# Patient Record
Sex: FEMALE | Race: WHITE | Employment: UNEMPLOYED | URBAN - METROPOLITAN AREA
[De-identification: names, ages, dates, MRNs, and addresses within clinical notes are randomized per-mention and may not be internally consistent; named-entity substitution may affect disease eponyms.]

---

## 2020-03-09 ENCOUNTER — APPOINTMENT (OUTPATIENT)
Dept: GENERAL RADIOLOGY | Age: 65
End: 2020-03-09
Payer: MEDICARE

## 2020-03-09 ENCOUNTER — HOSPITAL ENCOUNTER (EMERGENCY)
Age: 65
Discharge: HOME | End: 2020-03-09
Payer: MEDICARE

## 2020-03-09 ENCOUNTER — APPOINTMENT (OUTPATIENT)
Dept: CT IMAGING | Age: 65
End: 2020-03-09
Payer: MEDICARE

## 2020-03-09 VITALS
WEIGHT: 167.55 LBS | HEART RATE: 80 BPM | OXYGEN SATURATION: 93 % | RESPIRATION RATE: 14 BRPM | TEMPERATURE: 98.2 F | DIASTOLIC BLOOD PRESSURE: 58 MMHG | SYSTOLIC BLOOD PRESSURE: 135 MMHG

## 2020-03-09 PROCEDURE — 99285 EMERGENCY DEPT VISIT HI MDM: CPT | Performed by: ORTHOPAEDIC SURGERY

## 2020-03-09 PROCEDURE — 99284 EMERGENCY DEPT VISIT MOD MDM: CPT

## 2020-03-09 PROCEDURE — 73110 X-RAY EXAM OF WRIST: CPT

## 2020-03-09 PROCEDURE — 73080 X-RAY EXAM OF ELBOW: CPT

## 2020-03-09 PROCEDURE — 73030 X-RAY EXAM OF SHOULDER: CPT

## 2020-03-09 PROCEDURE — 73200 CT UPPER EXTREMITY W/O DYE: CPT

## 2020-03-09 NOTE — ED NOTES
Patient placed on bedpan, voided approx 100 cc dark yellow urine.  Patient aware will be going back to [facility]     Robertosahara Cooley RN  03/09/20 8922

## 2020-03-09 NOTE — ED NOTES
Bed: B-09  Expected date:   Expected time:   Means of arrival:   Comments:  75 F  Fall  Elbow injury     Trini Browne RN  03/09/20 2660

## 2020-03-09 NOTE — CONSULTS
[id number] [facility] Orthopedic Surgery  Consult Note        This patient is seen in consultation at the request of Milka Higginbotham    Reason for Consult:  Right elbow pain/ old radial head fracture with dislocation, no acute fracture. CHIEF COMPLAINT:  Right elbow pain/ fall. History Obtained From:  patient, electronic medical record    HISTORY OF PRESENT ILLNESS:   Ms. Sidra Hsieh is a 75 y.o.  female right handed who seen today for consultation and evaluation of a right elbow injury. The patient reports that this injury occurred when she fell. She was first seen and evaluated in [facility] ED, when she was x-rayed and I was consulted. Pain lateral right elbow is achy, rated 1/10 and radiate to forearm. Movement makes the pain worse, and resting makes the pain better. No numbness or tingling sensation. The patient denies any other injuries. She had a h/o old right elbow injury. Past Medical History:        Diagnosis Date    Anhidrosis     [facility]     Dementia ([facility]     Hypertension     Muscle weakness (generalized)     Schizophrenia ([facility]     Vitamin D deficiency        Past Surgical History:    History reviewed. No pertinent surgical history. Medications prior to admission:   Prior to Admission medications    Not on File       Current Medications:   No current facility-administered medications for this encounter. Allergies:  Patient has no known allergies.     Social History     Socioeconomic History    Marital status: Unknown     Spouse name: Not on file    Number of children: Not on file    Years of education: Not on file    Highest education level: Not on file   Occupational History    Not on file   Social Needs    Financial resource strain: Not on file    Food insecurity     Worry: Not on file     Inability: Not on file    Transportation needs     Medical: Not on file     Non-medical: Not on file   Tobacco Use    Smoking status: Never Smoker    Smokeless tobacco: Never Used   Substance and Sexual Activity    Alcohol use: Not Currently    Drug use: Never    Sexual activity: Not on file   Lifestyle    Physical activity     Days per week: Not on file     Minutes per session: Not on file    Stress: Not on file   Relationships    Social connections     Talks on phone: Not on file     Gets together: Not on file     Attends [employer] service: Not on file     Active member of club or organization: Not on file     Attends meetings of clubs or organizations: Not on file     Relationship status: Not on file    Intimate partner violence     Fear of current or ex partner: Not on file     Emotionally abused: Not on file     Physically abused: Not on file     Forced sexual activity: Not on file   Other Topics Concern    Not on file   Social History Narrative    Not on file       Family History:  History reviewed. No pertinent family history. REVIEW OF SYSTEMS:   CONSTITUTIONAL: Denies unexplained weight loss, fevers, chills or fatigue  NEUROLOGICAL: Denies unsteady gait or progressive weakness    PSYCHOLOGICAL: Denies anxiety, depression   SKIN: Denies skin changes, delayed healing, rash, itching   HEMATOLOGIC: Denies easy bleeding or bruising  ENDOCRINE: Denies excessive thirst, urination, heat/cold  RESPIRATORY: Denies current dyspnea, cough  CARDIOVASCULAR: Negative for chest pain at this time. EYES: Negative for photophobia and visual disturbance. ENT:  Negative for rhinorrhea, epistaxis, sore throat, or hearing loss. GI: Denies nausea, vomiting, diarrhea   : Denies bowel or bladder issues   MUSCULOSKELETAL: Right elbow pain. All other ROS reviewed in chart or with patient or family and are grossly negative. PHYSICAL EXAMINATION:  Ms. Cheng Marks is a very pleasant 75 y.o. female who seen today in no acute distress, awake, alert, and oriented. She is well nourished and groomed. Patient with normal affect. There is no height or weight on file to calculate BMI. . Skin be in 2 weeks PRN. I told the patient that it is not unusual to have some achy pain and swelling for up to a year after a fracture. Thank you very much for the kind consultation and allowing me to participate in this patient's care. I will continue to keep you apprised of her progress.          Vish Puentes MD   3/9/2020  3:17 PM

## 2020-03-09 NOTE — ED PROVIDER NOTES
Diagnosis Date    Anhidrosis     [facility]     [facility]     Hypertension     Muscle weakness (generalized)     [facility]     Vitamin D deficiency        SURGICAL HISTORY     History reviewed. No pertinent surgical history. CURRENT MEDICATIONS       Previous Medications    No medications on file       ALLERGIES     Patient has no known allergies. FAMILY HISTORY     History reviewed. No pertinent family history. No family status information on file. SOCIAL HISTORY      reports that she has never smoked. She has never used smokeless tobacco. She reports previous alcohol use. She reports that she does not use drugs. PHYSICAL EXAM    (up to 7 for level 4, 8 or more for level 5)     ED Triage Vitals [03/09/20 1241]   BP Temp Temp Source Pulse Resp SpO2 Height Weight   -- 97.4 °F (36.3 °C) Oral 78 16 94 % -- 167 lb 8.8 oz (76 kg)       Constitutional:  Appearing well-developed and well-nourished. No distress. HENT:  Normocephalic and atraumatic. Cardiovascular:  Normal rate, regular rhythm, normal heart sounds and intact distal pulses. Pulmonary/Chest:  Effort normal and breath sounds normal. No respiratory distress. Musculoskeletal: Deformity noted to the right elbow, but with decent range of motion to the elbow, and no significant bony tenderness to palpation. Ecchymosis noted to the right forearm and wrist.  Good range of motion to the right wrist and hand, negative for bony tenderness. 2+ radial pulse on the right. Mild tenderness to palpation to the right shoulder, but with good range of motion to the shoulder. No edema exhibited. Sensation to light touch grossly intact and capillary refill <3 seconds in the digits of the right upper extremity. Neurological:  Oriented to person, place, and time. No cranial nerve deficit. Skin:  Skin is warm and dry. Not diaphoretic. Psychiatric:  Normal mood, affect, behavior, judgment and thought content. patient's physical exam was unremarkable other than for the right arm findings noted above. X-rays showed some right elbow abnormalities, and subsequent CT scan of the elbow without contrast confirmed absent radial head with some ulnar dislocation but suggesting a chronic process, no evidence of acute acute fracture. I placed an orthopedics consult, and the patient was visited in the ED by Dr Jeanna Greenwood, who agreed that the abnormalities were nonacute. Patient had good neurovascular status in the affected extremity. She was given a sling in the ED. There was no indication for hospitalization or further workup. Patient will be discharged with instructions to follow-up with Dr. Jeanna Greenwood if needed. The patient verbalized understanding and agreement with this plan of care. The patient was advised to return to the emergency department if symptoms should significantly worsen or if new and concerning symptoms should appear. I estimate there is LOW risk for FRACTURE, COMPARTMENT SYNDROME, DEEP VENOUS THROMBOSIS, SEPTIC ARTHRITIS, TENDON OR NEUROVASCULAR INJURY, thus I consider the discharge disposition reasonable. PROCEDURES:  None    FINAL IMPRESSION      1. Fall at nursing home, initial encounter    2.  Arm injury, right, initial encounter          DISPOSITION/PLAN   DISPOSITION Decision To Discharge 03/09/2020 03:16:34 PM      PATIENT REFERRED TO:  Sherwin Krishna MD  [facility]  [address] [place] [address]  [phone]    Call   if needed, for orthopedic follow-up care      DISCHARGE MEDICATIONS:  New Prescriptions    No medications on file       (Please note that portions of this note were completed with a voice recognition program.  Efforts were made to edit the dictations but occasionally words are mis-transcribed.)    Olga Blair, [id number] [place] [address]  03/09/20 0536

## 2023-12-10 ENCOUNTER — HOSPITAL ENCOUNTER (INPATIENT)
Age: 68
LOS: 2 days | Discharge: NURSING, REHAB OR LONG-TERM CARE | DRG: 291 | End: 2023-12-12
Attending: EMERGENCY MEDICINE | Admitting: HOSPITALIST
Payer: MEDICARE

## 2023-12-10 ENCOUNTER — APPOINTMENT (OUTPATIENT)
Dept: CT IMAGING | Age: 68
DRG: 291 | End: 2023-12-10
Payer: MEDICARE

## 2023-12-10 ENCOUNTER — APPOINTMENT (OUTPATIENT)
Dept: GENERAL RADIOLOGY | Age: 68
DRG: 291 | End: 2023-12-10
Payer: MEDICARE

## 2023-12-10 DIAGNOSIS — R09.02 HYPOXIA: ICD-10-CM

## 2023-12-10 DIAGNOSIS — J90 PLEURAL EFFUSION: Primary | ICD-10-CM

## 2023-12-10 DIAGNOSIS — F41.9 ANXIETY: ICD-10-CM

## 2023-12-10 PROBLEM — I50.9 ACUTE ON CHRONIC HEART FAILURE, UNSPECIFIED HEART FAILURE TYPE (HCC): Status: ACTIVE | Noted: 2023-12-10

## 2023-12-10 PROBLEM — F20.9 SCHIZOPHRENIA (HCC): Status: ACTIVE | Noted: 2023-12-10

## 2023-12-10 LAB
ALBUMIN SERPL-MCNC: 1.9 G/DL (ref 3.4–5)
ALP SERPL-CCNC: 70 U/L (ref 40–129)
ALT SERPL-CCNC: <5 U/L (ref 10–40)
ANION GAP SERPL CALCULATED.3IONS-SCNC: 9 MMOL/L (ref 3–16)
ANION GAP SERPL CALCULATED.3IONS-SCNC: 9 MMOL/L (ref 3–16)
AST SERPL-CCNC: 15 U/L (ref 15–37)
BACTERIA URNS QL MICRO: NORMAL /HPF
BASE EXCESS BLDV CALC-SCNC: 9.1 MMOL/L
BASOPHILS # BLD: 0.1 K/UL (ref 0–0.2)
BASOPHILS NFR BLD: 1.3 %
BILIRUB DIRECT SERPL-MCNC: <0.2 MG/DL (ref 0–0.3)
BILIRUB INDIRECT SERPL-MCNC: ABNORMAL MG/DL (ref 0–1)
BILIRUB SERPL-MCNC: 0.3 MG/DL (ref 0–1)
BILIRUB UR QL STRIP.AUTO: NEGATIVE
BUN SERPL-MCNC: 34 MG/DL (ref 7–20)
BUN SERPL-MCNC: 36 MG/DL (ref 7–20)
CALCIUM SERPL-MCNC: 7.2 MG/DL (ref 8.3–10.6)
CALCIUM SERPL-MCNC: 7.9 MG/DL (ref 8.3–10.6)
CHLORIDE SERPL-SCNC: 100 MMOL/L (ref 99–110)
CHLORIDE SERPL-SCNC: 103 MMOL/L (ref 99–110)
CLARITY UR: CLEAR
CO2 BLDV-SCNC: 34 MMOL/L
CO2 SERPL-SCNC: 26 MMOL/L (ref 21–32)
CO2 SERPL-SCNC: 29 MMOL/L (ref 21–32)
COHGB MFR BLDV: 1.8 %
COLOR UR: YELLOW
CREAT SERPL-MCNC: 1.4 MG/DL (ref 0.6–1.2)
CREAT SERPL-MCNC: 1.5 MG/DL (ref 0.6–1.2)
DEPRECATED RDW RBC AUTO: 16.3 % (ref 12.4–15.4)
EOSINOPHIL # BLD: 0.2 K/UL (ref 0–0.6)
EOSINOPHIL NFR BLD: 1.8 %
EPI CELLS #/AREA URNS AUTO: 0 /HPF (ref 0–5)
GFR SERPLBLD CREATININE-BSD FMLA CKD-EPI: 37 ML/MIN/{1.73_M2}
GFR SERPLBLD CREATININE-BSD FMLA CKD-EPI: 40 ML/MIN/{1.73_M2}
GLUCOSE SERPL-MCNC: 83 MG/DL (ref 70–99)
GLUCOSE SERPL-MCNC: 87 MG/DL (ref 70–99)
GLUCOSE UR STRIP.AUTO-MCNC: NEGATIVE MG/DL
HCO3 BLDV-SCNC: 33 MMOL/L (ref 23–29)
HCT VFR BLD AUTO: 24 % (ref 36–48)
HGB BLD-MCNC: 8.4 G/DL (ref 12–16)
HGB UR QL STRIP.AUTO: NEGATIVE
HYALINE CASTS #/AREA URNS AUTO: 2 /LPF (ref 0–8)
IRON SATN MFR SERPL: 24 % (ref 15–50)
IRON SERPL-MCNC: 48 UG/DL (ref 37–145)
KETONES UR STRIP.AUTO-MCNC: NEGATIVE MG/DL
LACTATE BLDV-SCNC: 0.7 MMOL/L (ref 0.4–2)
LEUKOCYTE ESTERASE UR QL STRIP.AUTO: ABNORMAL
LIPASE SERPL-CCNC: 81 U/L (ref 13–60)
LYMPHOCYTES # BLD: 2.5 K/UL (ref 1–5.1)
LYMPHOCYTES NFR BLD: 26.8 %
MAGNESIUM SERPL-MCNC: 2.1 MG/DL (ref 1.8–2.4)
MCH RBC QN AUTO: 33.7 PG (ref 26–34)
MCHC RBC AUTO-ENTMCNC: 34.8 G/DL (ref 31–36)
MCV RBC AUTO: 96.8 FL (ref 80–100)
METHGB MFR BLDV: 0.6 %
MONOCYTES # BLD: 0.5 K/UL (ref 0–1.3)
MONOCYTES NFR BLD: 5 %
NEUTROPHILS # BLD: 6.1 K/UL (ref 1.7–7.7)
NEUTROPHILS NFR BLD: 65.1 %
NITRITE UR QL STRIP.AUTO: NEGATIVE
NT-PROBNP SERPL-MCNC: 2005 PG/ML (ref 0–124)
O2 THERAPY: ABNORMAL
PCO2 BLDV: 41.9 MMHG (ref 40–50)
PH BLDV: 7.5 [PH] (ref 7.35–7.45)
PH UR STRIP.AUTO: 6.5 [PH] (ref 5–8)
PLATELET # BLD AUTO: 534 K/UL (ref 135–450)
PMV BLD AUTO: 8.4 FL (ref 5–10.5)
PO2 BLDV: 54 MMHG
POTASSIUM SERPL-SCNC: 3.7 MMOL/L (ref 3.5–5.1)
POTASSIUM SERPL-SCNC: 3.7 MMOL/L (ref 3.5–5.1)
PROT SERPL-MCNC: 5.5 G/DL (ref 6.4–8.2)
PROT UR STRIP.AUTO-MCNC: NEGATIVE MG/DL
RBC # BLD AUTO: 2.48 M/UL (ref 4–5.2)
RBC CLUMPS #/AREA URNS AUTO: 2 /HPF (ref 0–4)
SAO2 % BLDV: 89 %
SARS-COV-2 RDRP RESP QL NAA+PROBE: NOT DETECTED
SODIUM SERPL-SCNC: 138 MMOL/L (ref 136–145)
SODIUM SERPL-SCNC: 138 MMOL/L (ref 136–145)
SP GR UR STRIP.AUTO: 1.01 (ref 1–1.03)
TIBC SERPL-MCNC: 203 UG/DL (ref 260–445)
TROPONIN, HIGH SENSITIVITY: 23 NG/L (ref 0–14)
TROPONIN, HIGH SENSITIVITY: 24 NG/L (ref 0–14)
UA COMPLETE W REFLEX CULTURE PNL UR: ABNORMAL
UA DIPSTICK W REFLEX MICRO PNL UR: YES
URN SPEC COLLECT METH UR: ABNORMAL
UROBILINOGEN UR STRIP-ACNC: 1 E.U./DL
WBC # BLD AUTO: 9.4 K/UL (ref 4–11)
WBC #/AREA URNS AUTO: 5 /HPF (ref 0–5)

## 2023-12-10 PROCEDURE — 83880 ASSAY OF NATRIURETIC PEPTIDE: CPT

## 2023-12-10 PROCEDURE — 83540 ASSAY OF IRON: CPT

## 2023-12-10 PROCEDURE — 84484 ASSAY OF TROPONIN QUANT: CPT

## 2023-12-10 PROCEDURE — 6360000002 HC RX W HCPCS: Performed by: HOSPITALIST

## 2023-12-10 PROCEDURE — 2580000003 HC RX 258: Performed by: EMERGENCY MEDICINE

## 2023-12-10 PROCEDURE — 6360000002 HC RX W HCPCS: Performed by: EMERGENCY MEDICINE

## 2023-12-10 PROCEDURE — 2700000000 HC OXYGEN THERAPY PER DAY

## 2023-12-10 PROCEDURE — 36415 COLL VENOUS BLD VENIPUNCTURE: CPT

## 2023-12-10 PROCEDURE — 71045 X-RAY EXAM CHEST 1 VIEW: CPT

## 2023-12-10 PROCEDURE — 87635 SARS-COV-2 COVID-19 AMP PRB: CPT

## 2023-12-10 PROCEDURE — 85025 COMPLETE CBC W/AUTO DIFF WBC: CPT

## 2023-12-10 PROCEDURE — P9612 CATHETERIZE FOR URINE SPEC: HCPCS

## 2023-12-10 PROCEDURE — 2060000000 HC ICU INTERMEDIATE R&B

## 2023-12-10 PROCEDURE — 83550 IRON BINDING TEST: CPT

## 2023-12-10 PROCEDURE — 70450 CT HEAD/BRAIN W/O DYE: CPT

## 2023-12-10 PROCEDURE — 87040 BLOOD CULTURE FOR BACTERIA: CPT

## 2023-12-10 PROCEDURE — 99285 EMERGENCY DEPT VISIT HI MDM: CPT

## 2023-12-10 PROCEDURE — 2580000003 HC RX 258: Performed by: HOSPITALIST

## 2023-12-10 PROCEDURE — 6370000000 HC RX 637 (ALT 250 FOR IP): Performed by: HOSPITALIST

## 2023-12-10 PROCEDURE — 83605 ASSAY OF LACTIC ACID: CPT

## 2023-12-10 PROCEDURE — 96372 THER/PROPH/DIAG INJ SC/IM: CPT

## 2023-12-10 PROCEDURE — 74176 CT ABD & PELVIS W/O CONTRAST: CPT

## 2023-12-10 PROCEDURE — 81001 URINALYSIS AUTO W/SCOPE: CPT

## 2023-12-10 PROCEDURE — 82803 BLOOD GASES ANY COMBINATION: CPT

## 2023-12-10 PROCEDURE — 80076 HEPATIC FUNCTION PANEL: CPT

## 2023-12-10 PROCEDURE — 80048 BASIC METABOLIC PNL TOTAL CA: CPT

## 2023-12-10 PROCEDURE — 6370000000 HC RX 637 (ALT 250 FOR IP): Performed by: EMERGENCY MEDICINE

## 2023-12-10 PROCEDURE — 83735 ASSAY OF MAGNESIUM: CPT

## 2023-12-10 PROCEDURE — 96374 THER/PROPH/DIAG INJ IV PUSH: CPT

## 2023-12-10 PROCEDURE — 83690 ASSAY OF LIPASE: CPT

## 2023-12-10 PROCEDURE — 94640 AIRWAY INHALATION TREATMENT: CPT

## 2023-12-10 PROCEDURE — 94760 N-INVAS EAR/PLS OXIMETRY 1: CPT

## 2023-12-10 RX ORDER — SODIUM CHLORIDE 0.9 % (FLUSH) 0.9 %
5-40 SYRINGE (ML) INJECTION PRN
Status: DISCONTINUED | OUTPATIENT
Start: 2023-12-10 | End: 2023-12-12 | Stop reason: HOSPADM

## 2023-12-10 RX ORDER — HALOPERIDOL 5 MG/ML
5 INJECTION INTRAMUSCULAR ONCE
Status: COMPLETED | OUTPATIENT
Start: 2023-12-10 | End: 2023-12-10

## 2023-12-10 RX ORDER — ACETAMINOPHEN 650 MG/1
650 SUPPOSITORY RECTAL EVERY 6 HOURS PRN
Status: DISCONTINUED | OUTPATIENT
Start: 2023-12-10 | End: 2023-12-12 | Stop reason: HOSPADM

## 2023-12-10 RX ORDER — RISPERIDONE 1 MG/1
2 TABLET ORAL 2 TIMES DAILY
Status: DISCONTINUED | OUTPATIENT
Start: 2023-12-10 | End: 2023-12-12 | Stop reason: HOSPADM

## 2023-12-10 RX ORDER — 0.9 % SODIUM CHLORIDE 0.9 %
1000 INTRAVENOUS SOLUTION INTRAVENOUS ONCE
Status: COMPLETED | OUTPATIENT
Start: 2023-12-10 | End: 2023-12-10

## 2023-12-10 RX ORDER — FUROSEMIDE 10 MG/ML
40 INJECTION INTRAMUSCULAR; INTRAVENOUS 2 TIMES DAILY
Status: DISCONTINUED | OUTPATIENT
Start: 2023-12-10 | End: 2023-12-12 | Stop reason: HOSPADM

## 2023-12-10 RX ORDER — POLYETHYLENE GLYCOL 3350 17 G/17G
17 POWDER, FOR SOLUTION ORAL DAILY PRN
COMMUNITY

## 2023-12-10 RX ORDER — ACETAMINOPHEN 325 MG/1
650 TABLET ORAL EVERY 6 HOURS PRN
COMMUNITY

## 2023-12-10 RX ORDER — FERROUS SULFATE 325(65) MG
325 TABLET ORAL
COMMUNITY

## 2023-12-10 RX ORDER — ASCORBIC ACID 500 MG
500 TABLET ORAL DAILY
COMMUNITY

## 2023-12-10 RX ORDER — POLYETHYLENE GLYCOL 3350 17 G/17G
17 POWDER, FOR SOLUTION ORAL DAILY PRN
Status: DISCONTINUED | OUTPATIENT
Start: 2023-12-10 | End: 2023-12-12 | Stop reason: HOSPADM

## 2023-12-10 RX ORDER — MIDODRINE HYDROCHLORIDE 5 MG/1
10 TABLET ORAL ONCE
Status: COMPLETED | OUTPATIENT
Start: 2023-12-10 | End: 2023-12-10

## 2023-12-10 RX ORDER — GUAIFENESIN 600 MG/1
600 TABLET, EXTENDED RELEASE ORAL DAILY
Status: DISCONTINUED | OUTPATIENT
Start: 2023-12-10 | End: 2023-12-12 | Stop reason: HOSPADM

## 2023-12-10 RX ORDER — LORAZEPAM 0.5 MG/1
0.5 TABLET ORAL EVERY 12 HOURS PRN
Status: ON HOLD | COMMUNITY
End: 2023-12-12 | Stop reason: SDUPTHER

## 2023-12-10 RX ORDER — SENNA AND DOCUSATE SODIUM 50; 8.6 MG/1; MG/1
1 TABLET, FILM COATED ORAL NIGHTLY
COMMUNITY

## 2023-12-10 RX ORDER — POTASSIUM CHLORIDE 7.45 MG/ML
10 INJECTION INTRAVENOUS PRN
Status: DISCONTINUED | OUTPATIENT
Start: 2023-12-10 | End: 2023-12-12 | Stop reason: HOSPADM

## 2023-12-10 RX ORDER — RISPERIDONE 2 MG/1
2 TABLET ORAL 2 TIMES DAILY
COMMUNITY

## 2023-12-10 RX ORDER — ENOXAPARIN SODIUM 100 MG/ML
40 INJECTION SUBCUTANEOUS DAILY
Status: DISCONTINUED | OUTPATIENT
Start: 2023-12-10 | End: 2023-12-10

## 2023-12-10 RX ORDER — ONDANSETRON 2 MG/ML
4 INJECTION INTRAMUSCULAR; INTRAVENOUS EVERY 6 HOURS PRN
Status: DISCONTINUED | OUTPATIENT
Start: 2023-12-10 | End: 2023-12-12 | Stop reason: HOSPADM

## 2023-12-10 RX ORDER — DIVALPROEX SODIUM 125 MG/1
750 CAPSULE, COATED PELLETS ORAL NIGHTLY
COMMUNITY

## 2023-12-10 RX ORDER — CALCIUM CARBONATE 500 MG/1
250 TABLET, CHEWABLE ORAL 2 TIMES DAILY
Status: DISCONTINUED | OUTPATIENT
Start: 2023-12-10 | End: 2023-12-12 | Stop reason: HOSPADM

## 2023-12-10 RX ORDER — DULOXETIN HYDROCHLORIDE 30 MG/1
30 CAPSULE, DELAYED RELEASE ORAL DAILY
COMMUNITY

## 2023-12-10 RX ORDER — SODIUM CHLORIDE 0.9 % (FLUSH) 0.9 %
5-40 SYRINGE (ML) INJECTION EVERY 12 HOURS SCHEDULED
Status: DISCONTINUED | OUTPATIENT
Start: 2023-12-10 | End: 2023-12-12 | Stop reason: HOSPADM

## 2023-12-10 RX ORDER — AMIODARONE HYDROCHLORIDE 200 MG/1
200 TABLET ORAL DAILY
COMMUNITY
Start: 2023-12-12

## 2023-12-10 RX ORDER — LORAZEPAM 2 MG/ML
2 INJECTION INTRAMUSCULAR ONCE
Status: COMPLETED | OUTPATIENT
Start: 2023-12-10 | End: 2023-12-10

## 2023-12-10 RX ORDER — AMIODARONE HYDROCHLORIDE 200 MG/1
400 TABLET ORAL 2 TIMES DAILY
Status: DISPENSED | OUTPATIENT
Start: 2023-12-10 | End: 2023-12-11

## 2023-12-10 RX ORDER — MIRTAZAPINE 7.5 MG/1
7.5 TABLET, FILM COATED ORAL NIGHTLY
COMMUNITY

## 2023-12-10 RX ORDER — TROSPIUM CHLORIDE 20 MG/1
20 TABLET, FILM COATED ORAL
Status: DISCONTINUED | OUTPATIENT
Start: 2023-12-10 | End: 2023-12-12 | Stop reason: HOSPADM

## 2023-12-10 RX ORDER — FERROUS SULFATE 325(65) MG
325 TABLET ORAL
Status: DISCONTINUED | OUTPATIENT
Start: 2023-12-10 | End: 2023-12-12 | Stop reason: HOSPADM

## 2023-12-10 RX ORDER — VIBEGRON 75 MG/1
75 TABLET, FILM COATED ORAL DAILY
COMMUNITY

## 2023-12-10 RX ORDER — IPRATROPIUM BROMIDE AND ALBUTEROL SULFATE 2.5; .5 MG/3ML; MG/3ML
1 SOLUTION RESPIRATORY (INHALATION) ONCE
Status: COMPLETED | OUTPATIENT
Start: 2023-12-10 | End: 2023-12-10

## 2023-12-10 RX ORDER — AMIODARONE HYDROCHLORIDE 200 MG/1
200 TABLET ORAL DAILY
Status: DISCONTINUED | OUTPATIENT
Start: 2023-12-12 | End: 2023-12-12 | Stop reason: HOSPADM

## 2023-12-10 RX ORDER — POTASSIUM CHLORIDE 20 MEQ/1
40 TABLET, EXTENDED RELEASE ORAL ONCE
Status: COMPLETED | OUTPATIENT
Start: 2023-12-10 | End: 2023-12-10

## 2023-12-10 RX ORDER — LORAZEPAM 0.5 MG/1
0.5 TABLET ORAL EVERY 12 HOURS PRN
Status: DISCONTINUED | OUTPATIENT
Start: 2023-12-10 | End: 2023-12-12 | Stop reason: HOSPADM

## 2023-12-10 RX ORDER — DIVALPROEX SODIUM 125 MG/1
500 CAPSULE, COATED PELLETS ORAL EVERY MORNING
COMMUNITY

## 2023-12-10 RX ORDER — ONDANSETRON 4 MG/1
4 TABLET, ORALLY DISINTEGRATING ORAL EVERY 8 HOURS PRN
Status: DISCONTINUED | OUTPATIENT
Start: 2023-12-10 | End: 2023-12-12 | Stop reason: HOSPADM

## 2023-12-10 RX ORDER — MIDODRINE HYDROCHLORIDE 5 MG/1
5 TABLET ORAL 3 TIMES DAILY PRN
Status: DISCONTINUED | OUTPATIENT
Start: 2023-12-10 | End: 2023-12-12 | Stop reason: HOSPADM

## 2023-12-10 RX ORDER — AMIODARONE HYDROCHLORIDE 200 MG/1
400 TABLET ORAL 2 TIMES DAILY
Status: ON HOLD | COMMUNITY
Start: 2023-12-08 | End: 2023-12-12 | Stop reason: HOSPADM

## 2023-12-10 RX ORDER — ASCORBIC ACID 500 MG
500 TABLET ORAL DAILY
Status: DISCONTINUED | OUTPATIENT
Start: 2023-12-10 | End: 2023-12-12 | Stop reason: HOSPADM

## 2023-12-10 RX ORDER — POLYETHYLENE GLYCOL 3350 17 G/17G
17 POWDER, FOR SOLUTION ORAL DAILY
Status: DISCONTINUED | OUTPATIENT
Start: 2023-12-10 | End: 2023-12-10 | Stop reason: SDUPTHER

## 2023-12-10 RX ORDER — ACETAMINOPHEN 325 MG/1
650 TABLET ORAL EVERY 6 HOURS PRN
Status: DISCONTINUED | OUTPATIENT
Start: 2023-12-10 | End: 2023-12-12 | Stop reason: HOSPADM

## 2023-12-10 RX ORDER — DULOXETIN HYDROCHLORIDE 30 MG/1
30 CAPSULE, DELAYED RELEASE ORAL DAILY
Status: DISCONTINUED | OUTPATIENT
Start: 2023-12-10 | End: 2023-12-12 | Stop reason: HOSPADM

## 2023-12-10 RX ORDER — POTASSIUM CHLORIDE 20 MEQ/1
40 TABLET, EXTENDED RELEASE ORAL PRN
Status: DISCONTINUED | OUTPATIENT
Start: 2023-12-10 | End: 2023-12-12 | Stop reason: HOSPADM

## 2023-12-10 RX ORDER — DIVALPROEX SODIUM 125 MG/1
500 CAPSULE, COATED PELLETS ORAL EVERY MORNING
Status: DISCONTINUED | OUTPATIENT
Start: 2023-12-10 | End: 2023-12-12 | Stop reason: HOSPADM

## 2023-12-10 RX ORDER — SENNA AND DOCUSATE SODIUM 50; 8.6 MG/1; MG/1
1 TABLET, FILM COATED ORAL NIGHTLY
Status: DISCONTINUED | OUTPATIENT
Start: 2023-12-10 | End: 2023-12-12 | Stop reason: HOSPADM

## 2023-12-10 RX ORDER — CODEINE PHOSPHATE AND GUAIFENESIN 10; 100 MG/5ML; MG/5ML
5 SOLUTION ORAL 4 TIMES DAILY PRN
Status: ON HOLD | COMMUNITY
End: 2023-12-10

## 2023-12-10 RX ORDER — MIDODRINE HYDROCHLORIDE 5 MG/1
TABLET ORAL SEE ADMIN INSTRUCTIONS
COMMUNITY

## 2023-12-10 RX ORDER — FUROSEMIDE 10 MG/ML
40 INJECTION INTRAMUSCULAR; INTRAVENOUS ONCE
Status: COMPLETED | OUTPATIENT
Start: 2023-12-10 | End: 2023-12-10

## 2023-12-10 RX ORDER — GUAIFENESIN 400 MG/1
400 TABLET ORAL DAILY
COMMUNITY

## 2023-12-10 RX ORDER — MIRTAZAPINE 15 MG/1
7.5 TABLET, FILM COATED ORAL NIGHTLY
Status: DISCONTINUED | OUTPATIENT
Start: 2023-12-10 | End: 2023-12-12 | Stop reason: HOSPADM

## 2023-12-10 RX ORDER — SODIUM CHLORIDE 9 MG/ML
INJECTION, SOLUTION INTRAVENOUS PRN
Status: DISCONTINUED | OUTPATIENT
Start: 2023-12-10 | End: 2023-12-12 | Stop reason: HOSPADM

## 2023-12-10 RX ORDER — MAGNESIUM SULFATE IN WATER 40 MG/ML
2000 INJECTION, SOLUTION INTRAVENOUS PRN
Status: DISCONTINUED | OUTPATIENT
Start: 2023-12-10 | End: 2023-12-12 | Stop reason: HOSPADM

## 2023-12-10 RX ORDER — DIVALPROEX SODIUM 125 MG/1
750 CAPSULE, COATED PELLETS ORAL NIGHTLY
Status: DISCONTINUED | OUTPATIENT
Start: 2023-12-10 | End: 2023-12-12 | Stop reason: HOSPADM

## 2023-12-10 RX ADMIN — RISPERIDONE 2 MG: 1 TABLET ORAL at 10:26

## 2023-12-10 RX ADMIN — HALOPERIDOL LACTATE 5 MG: 5 INJECTION, SOLUTION INTRAMUSCULAR at 03:07

## 2023-12-10 RX ADMIN — LORAZEPAM 0.5 MG: 0.5 TABLET ORAL at 14:29

## 2023-12-10 RX ADMIN — ANTACID TABLETS 250 MG: 500 TABLET, CHEWABLE ORAL at 10:28

## 2023-12-10 RX ADMIN — Medication 10 ML: at 10:29

## 2023-12-10 RX ADMIN — OXYCODONE HYDROCHLORIDE AND ACETAMINOPHEN 500 MG: 500 TABLET ORAL at 10:28

## 2023-12-10 RX ADMIN — AMIODARONE HYDROCHLORIDE 400 MG: 200 TABLET ORAL at 10:26

## 2023-12-10 RX ADMIN — DOCUSATE SODIUM 50MG AND SENNOSIDES 8.6MG 1 TABLET: 8.6; 5 TABLET, FILM COATED ORAL at 22:18

## 2023-12-10 RX ADMIN — ANTACID TABLETS 250 MG: 500 TABLET, CHEWABLE ORAL at 22:19

## 2023-12-10 RX ADMIN — MIDODRINE HYDROCHLORIDE 5 MG: 5 TABLET ORAL at 10:28

## 2023-12-10 RX ADMIN — DIVALPROEX SODIUM 750 MG: 125 CAPSULE, COATED PELLETS ORAL at 22:18

## 2023-12-10 RX ADMIN — RISPERIDONE 2 MG: 1 TABLET ORAL at 22:19

## 2023-12-10 RX ADMIN — FUROSEMIDE 40 MG: 10 INJECTION, SOLUTION INTRAMUSCULAR; INTRAVENOUS at 17:54

## 2023-12-10 RX ADMIN — TROSPIUM CHLORIDE 20 MG: 20 TABLET, FILM COATED ORAL at 17:53

## 2023-12-10 RX ADMIN — FUROSEMIDE 40 MG: 10 INJECTION, SOLUTION INTRAMUSCULAR; INTRAVENOUS at 10:28

## 2023-12-10 RX ADMIN — CARBIDOPA AND LEVODOPA 1 TABLET: 25; 100 TABLET ORAL at 10:26

## 2023-12-10 RX ADMIN — POTASSIUM CHLORIDE 40 MEQ: 1500 TABLET, EXTENDED RELEASE ORAL at 14:39

## 2023-12-10 RX ADMIN — MIRTAZAPINE 7.5 MG: 15 TABLET, FILM COATED ORAL at 22:19

## 2023-12-10 RX ADMIN — Medication 2 MG: at 05:00

## 2023-12-10 RX ADMIN — GUAIFENESIN 600 MG: 600 TABLET ORAL at 10:27

## 2023-12-10 RX ADMIN — DULOXETINE HYDROCHLORIDE 30 MG: 30 CAPSULE, DELAYED RELEASE ORAL at 10:28

## 2023-12-10 RX ADMIN — MIDODRINE HYDROCHLORIDE 10 MG: 5 TABLET ORAL at 02:42

## 2023-12-10 RX ADMIN — MIDODRINE HYDROCHLORIDE 5 MG: 5 TABLET ORAL at 18:08

## 2023-12-10 RX ADMIN — FUROSEMIDE 40 MG: 10 INJECTION, SOLUTION INTRAMUSCULAR; INTRAVENOUS at 04:00

## 2023-12-10 RX ADMIN — FERROUS SULFATE TAB 325 MG (65 MG ELEMENTAL FE) 325 MG: 325 (65 FE) TAB at 10:27

## 2023-12-10 RX ADMIN — Medication 10 ML: at 22:19

## 2023-12-10 RX ADMIN — IPRATROPIUM BROMIDE AND ALBUTEROL SULFATE 1 DOSE: .5; 3 SOLUTION RESPIRATORY (INHALATION) at 02:16

## 2023-12-10 RX ADMIN — SODIUM CHLORIDE 1000 ML: 9 INJECTION, SOLUTION INTRAVENOUS at 02:11

## 2023-12-10 RX ADMIN — AMIODARONE HYDROCHLORIDE 400 MG: 200 TABLET ORAL at 22:18

## 2023-12-10 RX ADMIN — APIXABAN 5 MG: 5 TABLET, FILM COATED ORAL at 22:19

## 2023-12-10 RX ADMIN — APIXABAN 5 MG: 5 TABLET, FILM COATED ORAL at 10:27

## 2023-12-10 RX ADMIN — CARBIDOPA AND LEVODOPA 1 TABLET: 25; 100 TABLET ORAL at 14:29

## 2023-12-10 RX ADMIN — CARBIDOPA AND LEVODOPA 1 TABLET: 25; 100 TABLET ORAL at 22:19

## 2023-12-10 RX ADMIN — Medication 10 ML: at 17:55

## 2023-12-10 RX ADMIN — DIVALPROEX SODIUM 500 MG: 125 CAPSULE, COATED PELLETS ORAL at 10:27

## 2023-12-10 ASSESSMENT — PAIN - FUNCTIONAL ASSESSMENT: PAIN_FUNCTIONAL_ASSESSMENT: 0-10

## 2023-12-10 ASSESSMENT — PAIN SCALES - GENERAL: PAINLEVEL_OUTOF10: 0

## 2023-12-10 NOTE — ED PROVIDER NOTES
history.    SOCIAL HISTORY       Social History     Socioeconomic History    Marital status: Unknown     Spouse name: None    Number of children: None    Years of education: None    Highest education level: None   Tobacco Use    Smoking status: Never    Smokeless tobacco: Never   Substance and Sexual Activity    Alcohol use: Not Currently    Drug use: Never       PHYSICAL EXAM       ED Triage Vitals [12/10/23 0048]   BP Temp Temp src Pulse Respirations SpO2 Height Weight   (!) 98/54 98.7 °F (37.1 °C) -- 89 16 96 % -- --       Physical Exam  Vitals and nursing note reviewed.   Constitutional:       General: She is not in acute distress.     Appearance: She is well-developed. She is ill-appearing. She is not toxic-appearing or diaphoretic.   HENT:      Head: Normocephalic and atraumatic.      Right Ear: External ear normal.      Left Ear: External ear normal.   Eyes:      General:         Right eye: No discharge.         Left eye: No discharge.      Conjunctiva/sclera: Conjunctivae normal.      Pupils: Pupils are equal, round, and reactive to light.   Cardiovascular:      Rate and Rhythm: Normal rate and regular rhythm.      Heart sounds: No murmur heard.  Pulmonary:      Effort: Pulmonary effort is normal. No respiratory distress.      Breath sounds: Normal breath sounds. No wheezing or rales.   Abdominal:      General: Bowel sounds are normal. There is no distension.      Palpations: Abdomen is soft. There is no mass.      Tenderness: There is no abdominal tenderness. There is no guarding or rebound.   Genitourinary:     Comments: Deferred  Musculoskeletal:         General: No deformity. Normal range of motion.      Cervical back: Normal range of motion and neck supple.   Skin:     General: Skin is warm.      Findings: No erythema or rash.   Neurological:      Mental Status: She is alert. She is disoriented.      Motor: No atrophy or abnormal muscle tone.   Psychiatric:         Behavior: Behavior normal.          Thought Content: Thought content normal.         DIAGNOSTIC RESULTS     EKG: All EKG's are interpreted by the Emergency Department Physician who either signs or Co-signs this chart in the absence of acardiologist.    Interpreted by myself     RADIOLOGY:   Non-plain film images such as CT, Ultrasoundand MRI are read by the radiologist. Plain radiographic images are visualized and preliminarily interpreted by the emergency physician with the below findings:    IMPRESSION:  1. Moderate bilateral pleural effusions are present. Opacified portions of  the posteroinferior lower lobes may be simple compressive atelectasis given  the adjacent effusions. However pneumonia cannot be excluded. 2.  Moderate to large hiatal hernia and likely esophagitis. There is no  bowel obstruction or rectal fecal impaction. 3.  Mild edema of the intra-abdominal fat and presacral fat. However there  is also general body wall edema. Cardiac chambers are not enlarged and  correlate with renal functions/volume overload. 4.  Large simple cyst at the left kidney would not require further workup. No renal calculi or hydronephrosis on either side. 5.  Mild compression deformity L1 but without acute fracture lines or  retropulsion. This may be a chronic deformity but no prior studies for  comparison.        ED BEDSIDE ULTRASOUND:   Performed by ED Physician - none    LABS:  Labs Reviewed   CBC WITH AUTO DIFFERENTIAL - Abnormal; Notable for the following components:       Result Value    RBC 2.48 (*)     Hemoglobin 8.4 (*)     Hematocrit 24.0 (*)     RDW 16.3 (*)     Platelets 015 (*)     All other components within normal limits   BASIC METABOLIC PANEL W/ REFLEX TO MG FOR LOW K - Abnormal; Notable for the following components:    BUN 34 (*)     Creatinine 1.4 (*)     Est, Glom Filt Rate 40 (*)     Calcium 7.9 (*)     All other components within normal limits   BLOOD GAS, VENOUS - Abnormal; Notable for the following components:

## 2023-12-10 NOTE — PROGRESS NOTES
Pharmacy Medication Reconciliation Note     List of medications patient is currently taking is complete. Source of information:   1. Medication list from [address] [place] [address]    Notes regarding home medications:   1. Medication list updated-all medications added.     Kathleen Raygoza PharmD, BCPS  12/10/2023 6:54 AM

## 2023-12-10 NOTE — H&P
V2.0  History and Physical      Name:  Carolyn Melgar /Age/Sex:   (77 y.o. female)   MRN & CSN:  [id number] & [id number] Encounter Date/Time: 12/10/2023 4:24 AM EST   Location:  B-10 PCP: No primary care provider on file. Hospital Day: 1    Assessment and Plan:   Carolyn Melgar is a 77 y.o. female with a pmh of cognitive dysfunction who presents with Acute on chronic heart failure, unspecified heart failure type [facility])    Hospital Problems             Last Modified POA    * (Principal) Acute on chronic heart failure, unspecified heart failure type ([address]) 12/10/2023 Yes    Schizophrenia ([address]) 12/10/2023 Yes       Plan:  Patient received Lasix IV in the emergency department. Will continue Lasix. While on Lasix will be given patient potassium. Trend BMP. Check echocardiogram.  Strict intake and output. Daily weights.  2 g cardiac diet ordered. Acute schizophrenia: Given one-time dose of IV Ativan to relax her. Disposition:   Current Living situation: Nursing home    Expected Disposition: Nursing home  Estimated D/C: 3 days    Diet ADULT DIET; Regular   DVT Prophylaxis [x] Lovenox, []  Heparin, [] SCDs, [] Ambulation,  [] Eliquis, [] Xarelto, [] Coumadin   Code Status Full code   Surrogate Decision Maker/ POA Unknown at this time     Personally reviewed Lab Studies and Imaging     Discussed management of the case with ED provider who recommended admission        Imaging that was interpreted personally includes abdomen and pelvis CT that showed moderate bilateral pleural effusions. Moderate to large hiatal hernia likely esophagitis. Mild edema of intra-abdominal fat and presacral facet. Large simple cyst left kidney. No renal compression L4-S1.       Drugs that require monitoring for toxicity include laxis and the method of monitoring was checking BMP        History from:     patient    History of Present Illness:     Chief Complaint: Abnormal lab results and hypoxia  Carolyn Melgar is   Medications:    Infusions:   PRN Meds:     Labs      CBC:   Recent Labs     12/10/23  0106   WBC 9.4   HGB 8.4*   *     BMP:    Recent Labs     12/10/23  0106      K 3.7      CO2 29   BUN 34*   CREATININE 1.4*   GLUCOSE 87     Hepatic:   Recent Labs     12/10/23  0106   AST 15   ALT <5*   BILITOT 0.3   ALKPHOS 70     Lipids: No results found for: \"CHOL\", \"HDL\", \"TRIG\"  Hemoglobin A1C: No results found for: \"LABA1C\"  TSH: No results found for: \"TSH\"  Troponin: No results found for: \"TROPONINT\"  Lactic Acid:   Recent Labs     12/10/23  0106   LACTA 0.7     BNP:   Recent Labs     12/10/23  0106   PROBNP 2,005*     UA:  Lab Results   Component Value Date/Time    NITRU Negative 12/10/2023 01:06 AM    COLORU Yellow 12/10/2023 01:06 AM    PHUR 6.5 12/10/2023 01:06 AM    WBCUA 5 12/10/2023 01:06 AM    RBCUA 2 12/10/2023 01:06 AM    BACTERIA None Seen 12/10/2023 01:06 AM    CLARITYU Clear 12/10/2023 01:06 AM    SPECGRAV 1.014 12/10/2023 01:06 AM    LEUKOCYTESUR TRACE 12/10/2023 01:06 AM    UROBILINOGEN 1.0 12/10/2023 01:06 AM    BILIRUBINUR Negative 12/10/2023 01:06 AM    BLOODU Negative 12/10/2023 01:06 AM    GLUCOSEU Negative 12/10/2023 01:06 AM    KETUA Negative 12/10/2023 01:06 AM     Urine Cultures: No results found for: \"LABURIN\"  Blood Cultures: No results found for: \"BC\"  No results found for: \"BLOODCULT2\"  Organism: No results found for: \"ORG\"    Imaging/Diagnostics Last 24 Hours   CT CHEST ABDOMEN PELVIS WO CONTRAST Additional Contrast? None    Result Date: 12/10/2023  EXAMINATION: CT OF THE CHEST, ABDOMEN, AND PELVIS WITHOUT CONTRAST 12/10/2023 2:01 am TECHNIQUE: CT of the chest, abdomen and pelvis was performed without the administration of intravenous contrast. Multiplanar reformatted images are provided for review. Automated exposure control, iterative reconstruction, and/or weight based adjustment of the mA/kV was utilized to reduce the radiation dose to as low as reasonably achievable.

## 2023-12-10 NOTE — ED NOTES
Report given to Felisha Joseph RN. She verbalized understanding of patient condition and has no further questions.      Ramona Jarquin  12/10/23 7714

## 2023-12-11 LAB
ANION GAP SERPL CALCULATED.3IONS-SCNC: 2 MMOL/L (ref 3–16)
BUN SERPL-MCNC: 37 MG/DL (ref 7–20)
CALCIUM SERPL-MCNC: 8.1 MG/DL (ref 8.3–10.6)
CHLORIDE SERPL-SCNC: 105 MMOL/L (ref 99–110)
CHOLEST SERPL-MCNC: 127 MG/DL (ref 0–199)
CO2 SERPL-SCNC: 31 MMOL/L (ref 21–32)
CREAT SERPL-MCNC: 1.7 MG/DL (ref 0.6–1.2)
GFR SERPLBLD CREATININE-BSD FMLA CKD-EPI: 32 ML/MIN/{1.73_M2}
GLUCOSE SERPL-MCNC: 92 MG/DL (ref 70–99)
HDLC SERPL-MCNC: 32 MG/DL (ref 40–60)
LDLC SERPL CALC-MCNC: 81 MG/DL
MAGNESIUM SERPL-MCNC: 1.9 MG/DL (ref 1.8–2.4)
POTASSIUM SERPL-SCNC: 4.3 MMOL/L (ref 3.5–5.1)
SODIUM SERPL-SCNC: 138 MMOL/L (ref 136–145)
TRIGL SERPL-MCNC: 70 MG/DL (ref 0–150)
VLDLC SERPL CALC-MCNC: 14 MG/DL

## 2023-12-11 PROCEDURE — 6370000000 HC RX 637 (ALT 250 FOR IP): Performed by: HOSPITALIST

## 2023-12-11 PROCEDURE — 97530 THERAPEUTIC ACTIVITIES: CPT

## 2023-12-11 PROCEDURE — 97162 PT EVAL MOD COMPLEX 30 MIN: CPT

## 2023-12-11 PROCEDURE — 2580000003 HC RX 258: Performed by: HOSPITALIST

## 2023-12-11 PROCEDURE — 97166 OT EVAL MOD COMPLEX 45 MIN: CPT

## 2023-12-11 PROCEDURE — 2060000000 HC ICU INTERMEDIATE R&B

## 2023-12-11 PROCEDURE — 97535 SELF CARE MNGMENT TRAINING: CPT

## 2023-12-11 PROCEDURE — 83735 ASSAY OF MAGNESIUM: CPT

## 2023-12-11 PROCEDURE — 80061 LIPID PANEL: CPT

## 2023-12-11 PROCEDURE — 36415 COLL VENOUS BLD VENIPUNCTURE: CPT

## 2023-12-11 PROCEDURE — 93306 TTE W/DOPPLER COMPLETE: CPT

## 2023-12-11 PROCEDURE — 94760 N-INVAS EAR/PLS OXIMETRY 1: CPT

## 2023-12-11 PROCEDURE — 80048 BASIC METABOLIC PNL TOTAL CA: CPT

## 2023-12-11 PROCEDURE — 6360000002 HC RX W HCPCS: Performed by: HOSPITALIST

## 2023-12-11 RX ORDER — MIDODRINE HYDROCHLORIDE 10 MG/1
10 TABLET ORAL
Status: DISCONTINUED | OUTPATIENT
Start: 2023-12-11 | End: 2023-12-12 | Stop reason: HOSPADM

## 2023-12-11 RX ADMIN — DOCUSATE SODIUM 50MG AND SENNOSIDES 8.6MG 1 TABLET: 8.6; 5 TABLET, FILM COATED ORAL at 20:53

## 2023-12-11 RX ADMIN — AMIODARONE HYDROCHLORIDE 400 MG: 200 TABLET ORAL at 20:52

## 2023-12-11 RX ADMIN — MIRTAZAPINE 7.5 MG: 15 TABLET, FILM COATED ORAL at 20:52

## 2023-12-11 RX ADMIN — APIXABAN 5 MG: 5 TABLET, FILM COATED ORAL at 08:58

## 2023-12-11 RX ADMIN — FERROUS SULFATE TAB 325 MG (65 MG ELEMENTAL FE) 325 MG: 325 (65 FE) TAB at 08:58

## 2023-12-11 RX ADMIN — DULOXETINE HYDROCHLORIDE 30 MG: 30 CAPSULE, DELAYED RELEASE ORAL at 08:58

## 2023-12-11 RX ADMIN — MIDODRINE HYDROCHLORIDE 10 MG: 10 TABLET ORAL at 13:33

## 2023-12-11 RX ADMIN — GUAIFENESIN 600 MG: 600 TABLET ORAL at 08:59

## 2023-12-11 RX ADMIN — CARBIDOPA AND LEVODOPA 1 TABLET: 25; 100 TABLET ORAL at 13:33

## 2023-12-11 RX ADMIN — DIVALPROEX SODIUM 500 MG: 125 CAPSULE, COATED PELLETS ORAL at 08:58

## 2023-12-11 RX ADMIN — RISPERIDONE 2 MG: 1 TABLET ORAL at 08:58

## 2023-12-11 RX ADMIN — ANTACID TABLETS 250 MG: 500 TABLET, CHEWABLE ORAL at 08:58

## 2023-12-11 RX ADMIN — MIDODRINE HYDROCHLORIDE 5 MG: 5 TABLET ORAL at 09:02

## 2023-12-11 RX ADMIN — CARBIDOPA AND LEVODOPA 1 TABLET: 25; 100 TABLET ORAL at 20:53

## 2023-12-11 RX ADMIN — DIVALPROEX SODIUM 750 MG: 125 CAPSULE, COATED PELLETS ORAL at 20:52

## 2023-12-11 RX ADMIN — OXYCODONE HYDROCHLORIDE AND ACETAMINOPHEN 500 MG: 500 TABLET ORAL at 08:59

## 2023-12-11 RX ADMIN — APIXABAN 5 MG: 5 TABLET, FILM COATED ORAL at 20:51

## 2023-12-11 RX ADMIN — TROSPIUM CHLORIDE 20 MG: 20 TABLET, FILM COATED ORAL at 06:52

## 2023-12-11 RX ADMIN — Medication 10 ML: at 21:02

## 2023-12-11 RX ADMIN — ANTACID TABLETS 250 MG: 500 TABLET, CHEWABLE ORAL at 20:53

## 2023-12-11 RX ADMIN — CARBIDOPA AND LEVODOPA 1 TABLET: 25; 100 TABLET ORAL at 08:59

## 2023-12-11 RX ADMIN — TROSPIUM CHLORIDE 20 MG: 20 TABLET, FILM COATED ORAL at 17:01

## 2023-12-11 RX ADMIN — FUROSEMIDE 40 MG: 10 INJECTION, SOLUTION INTRAMUSCULAR; INTRAVENOUS at 18:43

## 2023-12-11 RX ADMIN — MIDODRINE HYDROCHLORIDE 10 MG: 10 TABLET ORAL at 17:01

## 2023-12-11 RX ADMIN — RISPERIDONE 2 MG: 1 TABLET ORAL at 20:52

## 2023-12-11 RX ADMIN — Medication 10 ML: at 08:58

## 2023-12-11 ASSESSMENT — PAIN SCALES - GENERAL: PAINLEVEL_OUTOF10: 0

## 2023-12-11 NOTE — CONSULTS
Comprehensive Nutrition Assessment    Type and Reason for Visit:  Initial, Consult    Nutrition Recommendations/Plan:   Continue No Added Salt / 1500 ml per day  Add Ensure Compact bid to start given DTI to heels & potential for further breakdown as well as need for fluid control     Malnutrition Assessment:  Malnutrition Status:  No malnutrition (12/11/23 1435)    Context:  Chronic Illness         Nutrition Assessment:    Consult per CHF protocol. PMH includes Dementia, CHF. Diet adv to No Added Salt / 1500 ml per day. Pt reported and records confirm good po intake at %. Noted pt with very soft, mushy heels. DTIs noted. Wound care on board. Will offer Ensure Compact bid to start given potential for further skin breakdown and need for fluid modification. Pt is not appropriate for education r/t altered cognition as well as nutrition needs being met via facility. Will continue to follow progress. Nutrition Related Findings:    Labs reviewed. Noted +2 pitting edema to BLE. Noted pt +1.2 liters. Wound Type: Deep Tissue Injury (plus soft mushy heels)       Current Nutrition Intake & Therapies:    Average Meal Intake: %     ADULT DIET; Regular; No Added Salt (3-4 gm); 1500 ml    Est needs: 2542-4032 (20-25 x ABW 74 kg)                      (1.2-1.5 x ABW 74 kg)    Anthropometric Measures:  Height: 165.1 cm (5' 5\")  Ideal Body Weight (IBW): 125 lbs (57 kg)    Admission Body Weight: 74.8 kg (165 lb)  Current Body Weight: 73.9 kg (163 lb),   IBW. Weight Source: Bed Scale  Current BMI (kg/m2): 27.1                          BMI Categories: Overweight (BMI 25.0-29. 9)        Nutrition Diagnosis:   Increased nutrient needs   R/t increased need for nutrition  AEB Wounds    Nutrition Interventions:   Food and/or Nutrient Delivery: Continue Current Diet, Start Oral Nutrition Supplement  Nutrition Education/Counseling: Education not appropriate  Coordination of Nutrition Care: Continue to monitor while

## 2023-12-11 NOTE — FLOWSHEET NOTE
12/11/23 1030   Treatment Team Notification   Reason for Communication Abnormal vitals  (Low BP- holding amiodarone and lasix)   Name of Team Member Notified Dr. Krishna Loaiza Attending Provider   Method of Communication Secure Message   Response No new orders   Notification Time 1026     Patient bp at approximately 0830 was 86/64. PRN midodrine given for md order. Reassessed bp and obtained a reading of 91/50. Held amiodarone and lasix d/t hypotension. MD notified. NNO at this time. Update: See new order for routine midodrine.     Electronically signed by Jerald Kong RN on 12/11/2023 at 11:45 AM

## 2023-12-11 NOTE — PROGRESS NOTES
Occupational Therapy  Facility/Department: [facility] PROGRESSIVE CARE  Occupational Therapy Initial Assessment/Discharge    Name: Dolores Goodwin  :   MRN: [id number]  Date of Service: 2023    Discharge Recommendations:  Defer OT at this time, Other (comment) (pt refusing therapy)  OT Equipment Recommendations  Equipment Needed: No  Dolores Cousins scored a 3/99 on the AM-PAC ADL Inpatient form. At this time, no further OT is recommended upon discharge due to p refusing therapy. Recommend patient returns to prior setting with prior services. Patient Diagnosis(es): The primary encounter diagnosis was Pleural effusion. A diagnosis of Hypoxia was also pertinent to this visit. Past Medical History:  has a past medical history of Anhidrosis, Delusional disorder ([address]), Dementia ([address]), Hypertension, Muscle weakness (generalized), Schizophrenia ([address]), and Vitamin D deficiency. Past Surgical History:  has no past surgical history on file. Treatment Diagnosis: decreased ADL status and fxl transfers in preparation for ADLs      Assessment   Performance deficits / Impairments: Decreased cognition;Decreased ADL status; Decreased high-level IADLs;Decreased posture  Assessment: PTA, pt from [facility]. Difficult to get information from pt, she did not specify her ability to complete ADLs and reported she was able to complete SPT w/ staff to ge into w/c. Today- pt completed bed mobility w/ up to Max A x2, DEP for LB dressing, Max A for UB dressing, and DEP for toileting. Pt is anticipated to require up to DEP for full ADLs based on her current strength, endurance, and coordination as observed today. Pt will not continue to be seen while hospitalized d/t refusing therapy. She is anticipated to return back to [facility] post d/c from here.   Treatment Diagnosis: decreased ADL status and fxl transfers in preparation for ADLs  No Skilled OT: Patient refusal  REQUIRES OT FOLLOW-UP:

## 2023-12-11 NOTE — DISCHARGE INSTR - COC
whole    Wound Care Documentation and Therapy:  Wound 12/10/23 Heel Left;Posterior very soft mushy red nonblanchable (Active)   Wound Image   12/11/23 1000   Wound Etiology Deep tissue/Injury 12/11/23 1000   Dressing Status New dressing applied 12/10/23 2000   Wound Cleansed Not Cleansed 12/10/23 2000   Dressing/Treatment Barrier film 12/11/23 1000   Dressing Change Due 12/11/23 12/11/23 1000   Wound Length (cm) 2 cm 12/11/23 1000   Wound Width (cm) 1.5 cm 12/11/23 1000   Wound Surface Area (cm^2) 3 cm^2 12/11/23 1000   Wound Assessment Purple/maroon 12/11/23 1000   Drainage Amount None (dry) 12/11/23 1000   Odor None 12/11/23 1000   Ximena-wound Assessment Non-blanchable erythema 12/11/23 1000   Margins Defined edges 12/11/23 1000   Number of days: 1       Wound 12/10/23 Heel Right very red sft mushy ,non blanchable (Active)   Wound Image   12/11/23 1000   Wound Etiology Deep tissue/Injury 12/11/23 1000   Wound Cleansed Not Cleansed 12/10/23 0759   Dressing/Treatment Barrier film 12/11/23 1000   Dressing Change Due 12/11/23 12/11/23 1000   Wound Length (cm) 1 cm 12/11/23 1000   Wound Width (cm) 1 cm 12/11/23 1000   Wound Surface Area (cm^2) 1 cm^2 12/11/23 1000   Wound Assessment Purple/maroon 12/11/23 1000   Drainage Amount None (dry) 12/11/23 1000   Odor None 12/11/23 1000   Ximena-wound Assessment Non-blanchable erythema 12/11/23 1000   Margins Defined edges 12/11/23 1000   Number of days: 1        Elimination:  Continence:   Bowel: Yes  Bladder: Yes  Urinary Catheter: None   Colostomy/Ileostomy/Ileal Conduit: No       Date of Last BM: ***    Intake/Output Summary (Last 24 hours) at 12/11/2023 1439  Last data filed at 12/11/2023 1300  Gross per 24 hour   Intake 1750 ml   Output 1200 ml   Net 550 ml     I/O last 3 completed shifts:  In: 2440 [P.O.:1440; IV Piggyback:1000]  Out: 1200 [Urine:1200]    Safety Concerns:     At Risk for Falls    Impairments/Disabilities:      None    Nutrition Therapy:  Current Nutrition  follow up and palliative care consult for ongoing goals of care, end of life, and/or chronic disease management discussions. Patient's personal belongings (please select all that are sent with patient):  None    RN SIGNATURE:  Electronically signed by Raghavendra Estrada RN on 12/12/23 at 1:01 PM EST    CASE MANAGEMENT/SOCIAL WORK SECTION    Inpatient Status Date: 12/10/23    Readmission Risk Assessment Score:  Readmission Risk              Risk of Unplanned Readmission:  24           Discharging to Facility/ Agency   Name:  [facility]  Address:  [address]   Phone:  [phone]  Fax:  [phone]         / signature: Electronically signed by BLAINE Quesada on 12/12/23 at 3:55 PM EST    PHYSICIAN SECTION    Prognosis: Good    Condition at Discharge: Stable    Rehab Potential (if transferring to Rehab): Good    Recommended Labs or Other Treatments After Discharge:     PCP follow up in 2 weeks    Physician Certification: I certify the above information and transfer of Natty Ambrocio  is necessary for the continuing treatment of the diagnosis listed and that she requires [address] for greater 30 days.      Update Admission H&P: No change in H&P    PHYSICIAN SIGNATURE:  Electronically signed by Derrick Pierce MD on 12/12/23 at 10:49 AM EST

## 2023-12-11 NOTE — PROGRESS NOTES
evaluation without incident  Activity Tolerance Comments: pt screaming out, refusing OOB at this time     Plan   Physical Therapy Plan  General Plan: Discharge  Safety Devices  Type of Devices: Bed alarm in place, Call light within reach, Gait belt, Patient at risk for falls, Left in bed, Telesitter in use, Nurse notified  Restraints  Restraints Initially in Place: No     Restrictions  Restrictions/Precautions  Restrictions/Precautions: Up as Tolerated, Fall Risk  Position Activity Restriction  Other position/activity restrictions: wounds bottom and bilateral heels     Subjective   General  Chart Reviewed: Yes  Patient assessed for rehabilitation services?: Yes  Additional Pertinent Hx: \"abnormal labs       Pt present to the ED via ems from home c/o of abnormal lab. Per ems was discharged from  then routine lab were done at the nursing home . Per ems nursing home staff are concerned for sepsis.        HISTORY OF PRESENT ILLNESS   Bernadette Griffin is a 68 y.o. female who presents to the emergency department with abnormal labs.  Patient presents via EMS for abnormal labs.  Recently discharged from  for pneumonia.  Presents today with shortness of breath and found to be hypoxic.  Required a nasal cannula.  Patient altered but at baseline per nursing home.  History otherwise limited as patient has mental status changes that are chronic per nursing home.  No chest pain.  No rash.  No abdominal pain.  No back pain.  \" (Kosta Franklin MD  12/10/23 )  Response To Previous Treatment: Not applicable  Family / Caregiver Present: No  Referring Practitioner: Aurora Praker MD  Referral Date : 12/10/23  Diagnosis: sepsis, abnormal labs  Follows Commands: Impaired  Other (Comment): pt very agitated with cues and therapy attempt poor command following  General Comment  Comments: Co-tx with OT for safety  Subjective  Subjective: Pt needs motivation/redirection for therapy attempt. Pt wit hdecreased insight to  recommendations with focus on things such as getting a pop or BM. Social/Functional History  Social/Functional History  Type of Home: Facility ([facility])  Has the patient had two or more falls in the past year or any fall with injury in the past year?: Unknown  Receives Help From: Other (comment) (facility)  ADL Assistance: Needs assistance  Homemaking Assistance: Needs assistance  Ambulation Assistance: Non-ambulatory (use get up to wheelchair with stand pivot with staff; appears to be more bed bound at this time with postural impairments indicating this)  Transfer Assistance: Needs assistance  Additional Comments: Pt admitted to [facility] From 11/27-12/8 2023 due to pneumonia  Vision/Hearing  Vision  Vision:  (KALIA)  Hearing  Hearing: Within functional limits    Cognition   Orientation  Overall Orientation Status: Within Functional Limits  Cognition  Cognition Comment: KALIA d/t not being able to complete session w/ pt due to behaviors     Objective        Observation/Palpation  Posture: Poor  Observation: unable to achieve neutral ankle positioning, very rigid with minimal active movement  Edema: notable swelling in anthony forearms  Gross Assessment  AROM: Grossly decreased, non-functional  Strength: Grossly decreased, non-functional  Coordination: Grossly decreased, non-functional  Tone: Abnormal                    Bed mobility  Supine to Sit: Dependent/Total;2 Person assistance  Sit to Supine: Moderate assistance  Scooting: Dependent/Total;2 Person assistance  Bed Mobility Comments: Very poor participation with resistance to achieving upright positioning, decreased assistance due to motivated to return to bed. Transfers  Sit to Stand: Unable to assess  Stand to Sit: Unable to assess  Bed to Chair: Unable to assess  Comment: set patient up 1st attempt thanh noriega pt began yelling out, refusing to attempt due to fearful of falling.  THen PT attempted again to stand with patient without DME patient becomes

## 2023-12-11 NOTE — DISCHARGE INSTRUCTIONS
Extra Heart Failure sites:     https://[employer]. com/publication/?r=270588   --- this is American Heart Association interactive Healthier Living with Heart Failure guidebook. Please click hyperlink or copy / paste link into search bar. Use your mouse to scroll through the pages. Lots of information about weight monitoring, diet tips, activity, meds, etc    HF Clarks jose  -- this is a free smart phone jose available for iPhone and Android download. Use your phone to track sodium / fluid intake, zone tool symptom tracking, weights, medications, etc. Click on this hyperlink  HF Clarks Jose   for QR code for easy download. DASH (Dietary Approach to Stop Hypertension) diet --  [email or web address].no -- this diet is a flexible eating plan that promotes heart healthy eating style. Click on hyperlink or copy / paste link into search bar. Lots of low sodium recipes and tips.     [email or web address].ca  -- more free recipes

## 2023-12-11 NOTE — CARE COORDINATION
[facility] Nurse  Consult Note       NAME:  Bernadette Griffin  MEDICAL RECORD NUMBER:  [id number]  AGE: 68 y.o.   GENDER: female  : 1955  TODAY'S DATE:  2023    Subjective   Reason for WOCN Evaluation and Assessment: DTI to AGUSTÍN seth      Bernadette Griffin is a 68 y.o. female referred by:   [] Physician  [x] Nursing  [] Other:     Wound Identification:  Wound Type: pressure  Contributing Factors: chronic pressure and decreased mobility    Wound History: noted on admit  Current Wound Care Treatment:  foam borders    Patient Goal of Care:  [x] Wound Healing  [] Odor Control  [] Palliative Care  [] Pain Control   [] Other:         PAST MEDICAL HISTORY        Diagnosis Date    Anhidrosis     Delusional disorder (HCC)     Dementia (HCC)     Hypertension     Muscle weakness (generalized)     Schizophrenia (HCC)     Vitamin D deficiency        PAST SURGICAL HISTORY    History reviewed. No pertinent surgical history.    FAMILY HISTORY    History reviewed. No pertinent family history.    SOCIAL HISTORY    Social History     Tobacco Use    Smoking status: Never    Smokeless tobacco: Never   Substance Use Topics    Alcohol use: Not Currently    Drug use: Never       ALLERGIES    No Known Allergies    MEDICATIONS    No current facility-administered medications on file prior to encounter.     Current Outpatient Medications on File Prior to Encounter   Medication Sig Dispense Refill    [START ON 2023] amiodarone (CORDARONE) 200 MG tablet Take 1 tablet by mouth daily      amiodarone (CORDARONE) 200 MG tablet Take 2 tablets by mouth 2 times daily      apixaban (ELIQUIS) 5 MG TABS tablet Take 1 tablet by mouth 2 times daily      ascorbic acid (VITAMIN C) 500 MG tablet Take 1 tablet by mouth daily      Calcium Carbonate 500 (200 Ca) MG WAFR Take 200 mg by mouth in the morning and at bedtime      divalproex (DEPAKOTE SPRINKLE) 125 MG DR capsule Take 4 capsules by mouth every morning       to periarea  -turn and reposition every 2 hours  -chair cushion, encourage to reposition self frequently while in chair  -elevate heels in boots, apply liquid barrier film twice daily    Specialty Bed Required : Yes   [x] Low Air Loss   [] Pressure Redistribution  [] Fluid Immersion  [] Bariatric  [] Total Pressure Relief  [] Other:     Current Diet: ADULT DIET; Regular;  No Added Salt (3-4 gm); 1500 ml  Dietician consult:  Yes    Discharge Plan:  Placement for patient upon discharge: skilled nursing    Patient appropriate for Outpatient [address]: No    Referrals:  [x]   [] [facility]  [] Supplies  [] Other    Patient/Caregiver Teaching:  Level of patient/caregiver understanding able to:   [] Indicates understanding       [x] Needs reinforcement  [] Unsuccessful      [] Verbal Understanding  [] Demonstrated understanding       [] No evidence of learning  [] Refused teaching         [] N/A       Electronically signed by ROLANDA Linares on 12/11/2023 at 12:53 PM

## 2023-12-11 NOTE — PLAN OF CARE
Problem: Discharge Planning  Goal: Discharge to home or other facility with appropriate resources  Outcome: Progressing  Discharge to home or other facility with appropriate resources:   Identify barriers to discharge with patient and caregiver   Identify discharge learning needs (meds, wound care, etc)   Refer to discharge planning if patient needs post-hospital services based on physician order or complex needs related to functional status, cognitive ability or social support system   Arrange for needed discharge resources and transportation as appropriate        Problem: Skin/Tissue Integrity  Goal: Absence of new skin breakdown  Description: 1. Monitor for areas of redness and/or skin breakdown  2. Assess vascular access sites hourly  3. Every 4-6 hours minimum:  Change oxygen saturation probe site  4. Every 4-6 hours:  If on nasal continuous positive airway pressure, respiratory therapy assess nares and determine need for appliance change or resting period. Outcome: Progressing  Skin assessment completed every shift. Patient assessed for incontinence, appropriate barrier cream applied prn. Patient encouraged to turn/rotate every 2 hours. Assistance provided if patient unable to do so themselves. Problem: Safety - Adult  Goal: Free from fall injury  Outcome: Progressing  Fall risk assessment completed every shift. All precautions in place. Patient has call light with in reach at all times. Room free from clutter. Patient aware to call for assistance when getting up.

## 2023-12-11 NOTE — CONSULTS
Nutrition Note      Nutrition Note:  Consult received r/t altered skin integrity. Pt already assessed with nutrition plan in place. Please see Nutrition note dated 12/11/23.      Electronically signed by Willam Dallas RD, KORTNEY on 12/11/23 at 4:23 PM EST    Contact: [phone]

## 2023-12-11 NOTE — PROGRESS NOTES
4 Eyes Skin Assessment     NAME:  Desiree Rodriguez  YOB: 1955  MEDICAL RECORD NUMBER:  [id number]    The patient is being assessed for  Admission    I agree that at least one RN has performed a thorough Head to Toe Skin Assessment on the patient. ALL assessment sites listed below have been assessed. Areas assessed by both nurses:    Head, Face, Ears, Shoulders, Back, Chest, Arms, Elbows, Hands, Sacrum. Buttock, Coccyx, Ischium, Legs. Feet and Heels, Under Medical Devices , and Other general edema , scattered small brusing ,,bilateral heels very soft mushy red non blanchble mepelex applied elevated on 2 pillows ,  buttocks , christiano area excoriated slightly open , triad cream applied ,  coccyx red mepelex boarder in place   female purewick in use  ,        Does the Patient have a Wound? Yes wound(s) were present on assessment.  LDA wound assessment was Initiated and completed by RN       Clifford Prevention initiated by RN: Yes  Wound Care Orders initiated by RN: Yes    Pressure Injury (Stage 3,4, Unstageable, DTI, NWPT, and Complex wounds) if present, place Wound referral order by RN under : Yes  bilateral DTI  heels red mushy non blanchable     New Ostomies, if present place, Ostomy referral order under : No     Nurse 1 eSignature: Electronically signed by Dinora Chan RN on 12/10/23 at 8:24 PM EST    **SHARE this note so that the co-signing nurse can place an eSignature**    Nurse 2 eSignature: Electronically signed by Samuel Millard RN on 12/11/23 at 6:45 AM EST

## 2023-12-12 VITALS
DIASTOLIC BLOOD PRESSURE: 64 MMHG | TEMPERATURE: 97.4 F | HEIGHT: 65 IN | RESPIRATION RATE: 20 BRPM | BODY MASS INDEX: 26.15 KG/M2 | SYSTOLIC BLOOD PRESSURE: 104 MMHG | OXYGEN SATURATION: 92 % | HEART RATE: 95 BPM | WEIGHT: 156.97 LBS

## 2023-12-12 LAB
ANION GAP SERPL CALCULATED.3IONS-SCNC: 8 MMOL/L (ref 3–16)
BUN SERPL-MCNC: 50 MG/DL (ref 7–20)
CALCIUM SERPL-MCNC: 8.4 MG/DL (ref 8.3–10.6)
CHLORIDE SERPL-SCNC: 103 MMOL/L (ref 99–110)
CO2 SERPL-SCNC: 30 MMOL/L (ref 21–32)
CREAT SERPL-MCNC: 1.7 MG/DL (ref 0.6–1.2)
GFR SERPLBLD CREATININE-BSD FMLA CKD-EPI: 32 ML/MIN/{1.73_M2}
GLUCOSE SERPL-MCNC: 104 MG/DL (ref 70–99)
MAGNESIUM SERPL-MCNC: 2 MG/DL (ref 1.8–2.4)
POTASSIUM SERPL-SCNC: 4 MMOL/L (ref 3.5–5.1)
SODIUM SERPL-SCNC: 141 MMOL/L (ref 136–145)

## 2023-12-12 PROCEDURE — 80048 BASIC METABOLIC PNL TOTAL CA: CPT

## 2023-12-12 PROCEDURE — 6370000000 HC RX 637 (ALT 250 FOR IP): Performed by: HOSPITALIST

## 2023-12-12 PROCEDURE — 36415 COLL VENOUS BLD VENIPUNCTURE: CPT

## 2023-12-12 PROCEDURE — 6360000002 HC RX W HCPCS: Performed by: HOSPITALIST

## 2023-12-12 PROCEDURE — 83735 ASSAY OF MAGNESIUM: CPT

## 2023-12-12 PROCEDURE — 2580000003 HC RX 258: Performed by: HOSPITALIST

## 2023-12-12 PROCEDURE — 94760 N-INVAS EAR/PLS OXIMETRY 1: CPT

## 2023-12-12 RX ORDER — LORAZEPAM 0.5 MG/1
0.5 TABLET ORAL EVERY 12 HOURS PRN
Qty: 6 TABLET | Refills: 0 | Status: ON HOLD | OUTPATIENT
Start: 2023-12-12 | End: 2023-12-17 | Stop reason: HOSPADM

## 2023-12-12 RX ORDER — FUROSEMIDE 20 MG/1
20 TABLET ORAL DAILY
Qty: 60 TABLET | Refills: 3 | Status: SHIPPED | OUTPATIENT
Start: 2023-12-12

## 2023-12-12 RX ADMIN — RISPERIDONE 2 MG: 1 TABLET ORAL at 08:09

## 2023-12-12 RX ADMIN — AMIODARONE HYDROCHLORIDE 200 MG: 200 TABLET ORAL at 08:10

## 2023-12-12 RX ADMIN — GUAIFENESIN 600 MG: 600 TABLET ORAL at 08:10

## 2023-12-12 RX ADMIN — DULOXETINE HYDROCHLORIDE 30 MG: 30 CAPSULE, DELAYED RELEASE ORAL at 08:09

## 2023-12-12 RX ADMIN — TROSPIUM CHLORIDE 20 MG: 20 TABLET, FILM COATED ORAL at 06:28

## 2023-12-12 RX ADMIN — DIVALPROEX SODIUM 500 MG: 125 CAPSULE, COATED PELLETS ORAL at 08:09

## 2023-12-12 RX ADMIN — FERROUS SULFATE TAB 325 MG (65 MG ELEMENTAL FE) 325 MG: 325 (65 FE) TAB at 08:09

## 2023-12-12 RX ADMIN — OXYCODONE HYDROCHLORIDE AND ACETAMINOPHEN 500 MG: 500 TABLET ORAL at 08:09

## 2023-12-12 RX ADMIN — ANTACID TABLETS 250 MG: 500 TABLET, CHEWABLE ORAL at 08:10

## 2023-12-12 RX ADMIN — APIXABAN 5 MG: 5 TABLET, FILM COATED ORAL at 08:08

## 2023-12-12 RX ADMIN — MIDODRINE HYDROCHLORIDE 10 MG: 10 TABLET ORAL at 12:21

## 2023-12-12 RX ADMIN — Medication 10 ML: at 08:14

## 2023-12-12 RX ADMIN — CARBIDOPA AND LEVODOPA 1 TABLET: 25; 100 TABLET ORAL at 08:09

## 2023-12-12 RX ADMIN — FUROSEMIDE 40 MG: 10 INJECTION, SOLUTION INTRAMUSCULAR; INTRAVENOUS at 08:09

## 2023-12-12 RX ADMIN — MIDODRINE HYDROCHLORIDE 10 MG: 10 TABLET ORAL at 08:09

## 2023-12-12 NOTE — PROGRESS NOTES
Pt continues to refuse turns despite education on risk for pressure injuries. Pt is currently on speciality pressure reduction bed, wearing multi-podus boots, and has triad cream on sacrum and coccyx areas.

## 2023-12-12 NOTE — DISCHARGE SUMMARY
diet ordered.  Acute schizophrenia: Given one-time dose of IV Ativan to relax he     3 Hypotension      Would give Midodrine tid         12/12    Vitals stable  Denies sob   On room air , O 2 sat wnl    Clinically stable  Vital signs stable  Denies chest pain or sob    Physical Exam Performed:     /64   Pulse 95   Temp 97.4 °F (36.3 °C) (Axillary)   Resp 20   Ht 1.651 m (5' 5\")   Wt 71.2 kg (156 lb 15.5 oz)   SpO2 92%   BMI 26.12 kg/m²       General appearance:  No apparent distress, appears stated age and cooperative.  HEENT:  Normal cephalic, atraumatic without obvious deformity. Pupils equal, round, and reactive to light.  Extra ocular muscles intact. Conjunctivae/corneas clear.  Neck: Supple, with full range of motion. No jugular venous distention. Trachea midline.  Respiratory:  Normal respiratory effort. Clear to auscultation, bilaterally without Rales/Wheezes/Rhonchi.  Cardiovascular:  Regular rate and rhythm with normal S1/S2 without murmurs, rubs or gallops.  Abdomen: Soft, non-tender, non-distended with normal bowel sounds.  Musculoskeletal:  No clubbing, cyanosis or edema bilaterally.  Full range of motion without deformity.  Skin: Skin color, texture, turgor normal.  No rashes or lesions.  Neurologic:  Neurovascularly intact without any focal sensory/motor deficits. Cranial nerves: II-XII intact, grossly non-focal.  Psychiatric:  Alert and oriented, thought content appropriate, normal insight  Capillary Refill: Brisk,< 3 seconds   Peripheral Pulses: +2 palpable, equal bilaterally       Labs: For convenience and continuity at follow-up the following most recent labs are provided:      CBC:    Lab Results   Component Value Date/Time    WBC 9.4 12/10/2023 01:06 AM    HGB 8.4 12/10/2023 01:06 AM    HCT 24.0 12/10/2023 01:06 AM     12/10/2023 01:06 AM       Renal:    Lab Results   Component Value Date/Time     12/12/2023 05:24 AM    K 4.0 12/12/2023 05:24 AM    K 3.7 12/10/2023 01:06  dailyHistorical Med      polyethylene glycol (MIRALAX) 17 g PACK packet Take 17 g by mouth daily as needed (constipation)Historical Med      risperiDONE (RISPERDAL) 2 MG tablet Take 1 tablet by mouth 2 times dailyHistorical Med      sennosides-docusate sodium (SENOKOT-S) 8.6-50 MG tablet Take 1 tablet by mouth at bedtimeHistorical Med      carbidopa-levodopa (SINEMET)  MG per tablet Take 1 tablet by mouth 3 times dailyHistorical Med      acetaminophen (TYLENOL) 325 MG tablet Take 2 tablets by mouth every 6 hours as needed for Pain or FeverHistorical Med       !! - Potential duplicate medications found. Please discuss with provider. Time Spent on discharge is more than 45 minutes in the examination, evaluation, counseling and review of medications and discharge plan. Signed:    Cristiano Tsang MD   12/12/2023      Thank you No primary care provider on file. for the opportunity to be involved in this patient's care. If you have any questions or concerns please feel free to contact me at [phone].

## 2023-12-12 NOTE — NURSE NAVIGATOR
Dc order noted. Pt is from a LTC facility, [facility], where her HF care is managed. Appropriate HF orders are in place. HF dc instructions are on AVS as well as on EDDIE for facility to follow. Facility MD to follow.

## 2023-12-12 NOTE — PLAN OF CARE
Problem: Discharge Planning  Goal: Discharge to home or other facility with appropriate resources  Outcome: Progressing     Problem: Safety - Adult  Goal: Free from fall injury  Outcome: Progressing     Problem: Respiratory - Adult  Goal: Achieves optimal ventilation and oxygenation  Outcome: Progressing     Problem: Cardiovascular - Adult  Goal: Maintains optimal cardiac output and hemodynamic stability  Outcome: Progressing  Goal: Absence of cardiac dysrhythmias or at baseline  Outcome: Progressing     Problem: Musculoskeletal - Adult  Goal: Return mobility to safest level of function  Outcome: Progressing  Goal: Maintain proper alignment of affected body part  Outcome: Progressing  Goal: Return ADL status to a safe level of function  Outcome: Progressing     Problem: Metabolic/Fluid and Electrolytes - Adult  Goal: Electrolytes maintained within normal limits  Outcome: Progressing  Goal: Hemodynamic stability and optimal renal function maintained  Outcome: Progressing  Goal: Glucose maintained within prescribed range  Outcome: Progressing     Problem: Hematologic - Adult  Goal: Maintains hematologic stability  Outcome: Progressing     Problem: Skin/Tissue Integrity  Goal: Absence of new skin breakdown  Description: 1. Monitor for areas of redness and/or skin breakdown  2. Assess vascular access sites hourly  3. Every 4-6 hours minimum:  Change oxygen saturation probe site  4. Every 4-6 hours:  If on nasal continuous positive airway pressure, respiratory therapy assess nares and determine need for appliance change or resting period.   Outcome: Not Progressing

## 2023-12-12 NOTE — PROGRESS NOTES
Pt discharged at this time. Reviewed all discharge instructions and follow up appts with patient. Patient states that she understands. Ivs and tele has been removed at this time without complications. Pt does not voice any questions or concerns at this time. Belongings in hand. Patient transported by ems . Patient tolerated well.   Nurse at [facility] has been updated on pt return

## 2023-12-12 NOTE — CARE COORDINATION
Case Management Assessment  Initial Evaluation    Date/Time of Evaluation: 12/12/2023 11:28 AM  Assessment Completed by: BLAINE Yanes    If patient is discharged prior to next notation, then this note serves as note for discharge by case management. Patient Name: Luna Mireles                   YOB: 1955  Diagnosis: Pleural effusion [J90]  Hypoxia [R09.02]  Acute on chronic heart failure, unspecified heart failure type [facility]) [I50.9]                   Date / Time: 12/10/2023 12:20 AM    Patient Admission Status: Inpatient   Readmission Risk (Low < 19, Mod (19-27), High > 27): Readmission Risk Score: 15    Current PCP: No primary care provider on file. PCP verified by CM? (P) Yes    Chart Reviewed: Yes      History Provided by: (P) Patient, Other (see comment) ([address])  Patient Orientation: (P) Alert and Oriented    Patient Cognition: (P) Alert    Hospitalization in the last 30 days (Readmission):  No    If yes, Readmission Assessment in  Navigator will be completed.     Advance Directives:      Code Status: Full Code   Patient's Primary Decision Maker is: (P)  (Legal Jone Ary)      Discharge Planning:    Patient lives with: (P) Other (Comment) ([address]) Type of Home: (P) Long-Term Care  Primary Care Giver: (P) Other (Comment) ([address])  Patient Support Systems include: (P) Other (Comment) (GROU.PS Ary)   Current Financial resources: (P) Medicare  Current community resources: (P) Other (Comment) ([address])  Current services prior to admission: (P) Other (Comment) ([address])            Current DME:              Type of Home Care services:  (P) None    ADLS  Prior functional level: (P) Assistance with the following:, Bathing, Toileting, Dressing, Feeding, Housework, Cooking, Shopping,

## 2023-12-14 LAB
BACTERIA BLD CULT: NORMAL
BACTERIA BLD CULT: NORMAL

## 2023-12-14 NOTE — PROGRESS NOTES
Physician Progress Note      PATIENT:               Paul Granda  CSN #:                  [id number]  :                       1955  ADMIT DATE:       12/10/2023 12:20 AM  DISCH DATE:        2023 1:39 PM  RESPONDING  PROVIDER #:        Savannah Loredo MD          QUERY TEXT:    Dear Dr. Megan Hi,  Pt admitted with acute on chronic CHF documented and was treated with IV   Lasix. If possible, please document in progress notes and discharge summary   further specificity regarding the type and acuity of CHF:    The medical record reflects the following:  Risk Factors: Hx HTN, Schizophrenia, Dementia, recent admission for pneumonia  Clinical Indicators: 12/10 ED for SOB, hypoxia, GT with moderate bilat pleural   effusions, Pro-TLT=7103, Admitted for Acute on chronic heart failure.    Echo ZO=34-92%, normal diastolic function  Treatment: I and O, Daily weight, IV Lasix, O2 2L/min NC, Echo, discharged on   lasix po 20 mg daily  Thank you,  Dave Pope RN, [email or web address]  Options provided:  -- Acute on Chronic Diastolic CHF/HFpEF  -- Acute on Chronic Systolic CHF/HFrEF  -- Acute on Chronic Systolic and Diastolic CHF  -- Other - I will add my own diagnosis  -- Disagree - Not applicable / Not valid  -- Disagree - Clinically unable to determine / Unknown  -- Refer to Clinical Documentation Reviewer    PROVIDER RESPONSE TEXT:    This patient is in acute on chronic systolic CHF/HFrEF. Query created by:  PlayWith on 2023 12:28 PM      Electronically signed by:  Savannah Loredo MD 2023 12:41 PM

## 2023-12-15 PROBLEM — N18.9 ACUTE ON CHRONIC RENAL FAILURE (HCC): Status: ACTIVE | Noted: 2023-12-15

## 2023-12-15 PROBLEM — E88.09 HYPOALBUMINEMIA: Status: ACTIVE | Noted: 2023-12-15

## 2023-12-15 PROBLEM — E83.51 HYPOCALCEMIA: Status: ACTIVE | Noted: 2023-12-15

## 2023-12-15 PROBLEM — N17.9 ACUTE ON CHRONIC RENAL FAILURE (HCC): Status: ACTIVE | Noted: 2023-12-15

## 2023-12-15 PROBLEM — N18.9 ACUTE ON CHRONIC RENAL FAILURE (HCC): Status: RESOLVED | Noted: 2023-12-15 | Resolved: 2023-12-15

## 2023-12-15 PROBLEM — N18.9 CKD (CHRONIC KIDNEY DISEASE): Status: ACTIVE | Noted: 2023-12-15

## 2023-12-15 PROBLEM — N17.9 ACUTE ON CHRONIC RENAL FAILURE (HCC): Status: RESOLVED | Noted: 2023-12-15 | Resolved: 2023-12-15

## 2023-12-15 PROBLEM — D64.9 ANEMIA: Status: ACTIVE | Noted: 2023-12-15

## 2024-01-01 ENCOUNTER — HOSPITAL ENCOUNTER (INPATIENT)
Age: 69
LOS: 1 days | DRG: 871 | End: 2024-12-26
Attending: STUDENT IN AN ORGANIZED HEALTH CARE EDUCATION/TRAINING PROGRAM
Payer: MEDICARE

## 2024-01-01 ENCOUNTER — APPOINTMENT (OUTPATIENT)
Dept: CT IMAGING | Age: 69
DRG: 871 | End: 2024-01-01
Payer: MEDICARE

## 2024-01-01 ENCOUNTER — APPOINTMENT (OUTPATIENT)
Dept: GENERAL RADIOLOGY | Age: 69
DRG: 871 | End: 2024-01-01
Payer: MEDICARE

## 2024-01-01 VITALS
SYSTOLIC BLOOD PRESSURE: 75 MMHG | DIASTOLIC BLOOD PRESSURE: 62 MMHG | OXYGEN SATURATION: 22 % | WEIGHT: 130.95 LBS | BODY MASS INDEX: 21.79 KG/M2

## 2024-01-01 LAB
ABO + RH BLD: NORMAL
ALBUMIN SERPL-MCNC: 2.1 G/DL (ref 3.4–5)
ALP SERPL-CCNC: 71 U/L (ref 40–129)
ALT SERPL-CCNC: <5 U/L (ref 10–40)
ANION GAP SERPL CALCULATED.3IONS-SCNC: 13 MMOL/L (ref 3–16)
ANION GAP SERPL CALCULATED.3IONS-SCNC: 9 MMOL/L (ref 3–16)
AST SERPL-CCNC: 15 U/L (ref 15–37)
BACTERIA URNS QL MICRO: ABNORMAL /HPF
BASE EXCESS BLDA CALC-SCNC: -9.2 MMOL/L (ref -3–3)
BASE EXCESS BLDV CALC-SCNC: -9.9 MMOL/L
BILIRUB DIRECT SERPL-MCNC: <0.1 MG/DL (ref 0–0.3)
BILIRUB INDIRECT SERPL-MCNC: ABNORMAL MG/DL (ref 0–1)
BILIRUB SERPL-MCNC: <0.2 MG/DL (ref 0–1)
BILIRUB UR QL STRIP.AUTO: NEGATIVE
BLD GP AB SCN SERPL QL: NORMAL
BUN SERPL-MCNC: 66 MG/DL (ref 7–20)
BUN SERPL-MCNC: 71 MG/DL (ref 7–20)
CALCIUM OXALATE CRYSTALS: PRESENT
CALCIUM SERPL-MCNC: 16.1 MG/DL (ref 8.3–10.6)
CALCIUM SERPL-MCNC: 16.3 MG/DL (ref 8.3–10.6)
CHLORIDE SERPL-SCNC: 112 MMOL/L (ref 99–110)
CHLORIDE SERPL-SCNC: 116 MMOL/L (ref 99–110)
CLARITY UR: ABNORMAL
CO2 BLDA-SCNC: 23.7 MMOL/L
CO2 BLDV-SCNC: 22 MMOL/L
CO2 SERPL-SCNC: 19 MMOL/L (ref 21–32)
CO2 SERPL-SCNC: 21 MMOL/L (ref 21–32)
COHGB MFR BLDA: 1.1 % (ref 0–1.5)
COHGB MFR BLDV: 1.2 %
COLOR UR: ABNORMAL
CREAT SERPL-MCNC: 3.4 MG/DL (ref 0.6–1.2)
CREAT SERPL-MCNC: 3.7 MG/DL (ref 0.6–1.2)
EKG DIAGNOSIS: NORMAL
EKG Q-T INTERVAL: 450 MS
EKG QRS DURATION: 148 MS
EKG QTC CALCULATION (BAZETT): 456 MS
EKG R AXIS: -46 DEGREES
EKG T AXIS: 125 DEGREES
EKG VENTRICULAR RATE: 62 BPM
EPI CELLS #/AREA URNS AUTO: 7 /HPF (ref 0–5)
GFR SERPLBLD CREATININE-BSD FMLA CKD-EPI: 12 ML/MIN/{1.73_M2}
GFR SERPLBLD CREATININE-BSD FMLA CKD-EPI: 14 ML/MIN/{1.73_M2}
GLUCOSE BLD-MCNC: 188 MG/DL (ref 70–99)
GLUCOSE SERPL-MCNC: 71 MG/DL (ref 70–99)
GLUCOSE SERPL-MCNC: 74 MG/DL (ref 70–99)
GLUCOSE UR STRIP.AUTO-MCNC: NEGATIVE MG/DL
HCO3 BLDA-SCNC: 21.4 MMOL/L (ref 21–29)
HCO3 BLDV-SCNC: 20 MMOL/L (ref 23–29)
HGB BLDA-MCNC: 9.1 G/DL (ref 12–16)
HGB UR QL STRIP.AUTO: ABNORMAL
HYALINE CASTS #/AREA URNS AUTO: 15 /LPF (ref 0–8)
INR PPP: 2.05 (ref 0.85–1.15)
KETONES UR STRIP.AUTO-MCNC: NEGATIVE MG/DL
LACTATE BLDV-SCNC: 4 MMOL/L (ref 0.4–2)
LEUKOCYTE ESTERASE UR QL STRIP.AUTO: ABNORMAL
LIPASE SERPL-CCNC: 24 U/L (ref 13–60)
MAGNESIUM SERPL-MCNC: 3.2 MG/DL (ref 1.8–2.4)
METHGB MFR BLDA: 0.3 %
METHGB MFR BLDV: 0.5 %
NITRITE UR QL STRIP.AUTO: NEGATIVE
NT-PROBNP SERPL-MCNC: 3792 PG/ML (ref 0–124)
O2 THERAPY: ABNORMAL
O2 THERAPY: ABNORMAL
PCO2 BLDA: 76.1 MMHG (ref 35–45)
PCO2 BLDV: 71.7 MMHG (ref 40–50)
PERFORMED ON: ABNORMAL
PH BLDA: 7.06 [PH] (ref 7.35–7.45)
PH BLDV: 7.06 [PH] (ref 7.35–7.45)
PH UR STRIP.AUTO: 5.5 [PH] (ref 5–8)
PO2 BLDA: 107 MMHG (ref 75–108)
PO2 BLDV: 44 MMHG
POTASSIUM SERPL-SCNC: 5.9 MMOL/L (ref 3.5–5.1)
POTASSIUM SERPL-SCNC: 6.9 MMOL/L (ref 3.5–5.1)
PROT SERPL-MCNC: 5.3 G/DL (ref 6.4–8.2)
PROT UR STRIP.AUTO-MCNC: 100 MG/DL
PROTHROMBIN TIME: 23.1 SEC (ref 11.9–14.9)
RBC CLUMPS #/AREA URNS AUTO: 171 /HPF (ref 0–4)
SAO2 % BLDA: 95.5 %
SAO2 % BLDV: 58 %
SODIUM SERPL-SCNC: 144 MMOL/L (ref 136–145)
SODIUM SERPL-SCNC: 146 MMOL/L (ref 136–145)
SP GR UR STRIP.AUTO: 1.01 (ref 1–1.03)
TROPONIN, HIGH SENSITIVITY: 70 NG/L (ref 0–14)
TROPONIN, HIGH SENSITIVITY: 84 NG/L (ref 0–14)
UA COMPLETE W REFLEX CULTURE PNL UR: YES
UA DIPSTICK W REFLEX MICRO PNL UR: YES
URN SPEC COLLECT METH UR: ABNORMAL
UROBILINOGEN UR STRIP-ACNC: 0.2 E.U./DL
WBC #/AREA URNS AUTO: 946 /HPF (ref 0–5)

## 2024-01-01 PROCEDURE — 93010 ELECTROCARDIOGRAM REPORT: CPT | Performed by: INTERNAL MEDICINE

## 2024-01-01 PROCEDURE — 99285 EMERGENCY DEPT VISIT HI MDM: CPT

## 2024-01-01 PROCEDURE — 96361 HYDRATE IV INFUSION ADD-ON: CPT

## 2024-01-01 PROCEDURE — 96375 TX/PRO/DX INJ NEW DRUG ADDON: CPT

## 2024-01-01 PROCEDURE — 83605 ASSAY OF LACTIC ACID: CPT

## 2024-01-01 PROCEDURE — 94761 N-INVAS EAR/PLS OXIMETRY MLT: CPT

## 2024-01-01 PROCEDURE — 36600 WITHDRAWAL OF ARTERIAL BLOOD: CPT

## 2024-01-01 PROCEDURE — 87150 DNA/RNA AMPLIFIED PROBE: CPT

## 2024-01-01 PROCEDURE — 71045 X-RAY EXAM CHEST 1 VIEW: CPT

## 2024-01-01 PROCEDURE — 85610 PROTHROMBIN TIME: CPT

## 2024-01-01 PROCEDURE — 80048 BASIC METABOLIC PNL TOTAL CA: CPT

## 2024-01-01 PROCEDURE — 87077 CULTURE AEROBIC IDENTIFY: CPT

## 2024-01-01 PROCEDURE — 87186 SC STD MICRODIL/AGAR DIL: CPT

## 2024-01-01 PROCEDURE — 6360000004 HC RX CONTRAST MEDICATION: Performed by: STUDENT IN AN ORGANIZED HEALTH CARE EDUCATION/TRAINING PROGRAM

## 2024-01-01 PROCEDURE — 83880 ASSAY OF NATRIURETIC PEPTIDE: CPT

## 2024-01-01 PROCEDURE — 86901 BLOOD TYPING SEROLOGIC RH(D): CPT

## 2024-01-01 PROCEDURE — 85025 COMPLETE CBC W/AUTO DIFF WBC: CPT

## 2024-01-01 PROCEDURE — 81001 URINALYSIS AUTO W/SCOPE: CPT

## 2024-01-01 PROCEDURE — 6370000000 HC RX 637 (ALT 250 FOR IP): Performed by: NURSE PRACTITIONER

## 2024-01-01 PROCEDURE — 80076 HEPATIC FUNCTION PANEL: CPT

## 2024-01-01 PROCEDURE — 6360000002 HC RX W HCPCS: Performed by: NURSE PRACTITIONER

## 2024-01-01 PROCEDURE — 72125 CT NECK SPINE W/O DYE: CPT

## 2024-01-01 PROCEDURE — 6360000002 HC RX W HCPCS

## 2024-01-01 PROCEDURE — 83690 ASSAY OF LIPASE: CPT

## 2024-01-01 PROCEDURE — 94660 CPAP INITIATION&MGMT: CPT

## 2024-01-01 PROCEDURE — 6360000002 HC RX W HCPCS: Performed by: STUDENT IN AN ORGANIZED HEALTH CARE EDUCATION/TRAINING PROGRAM

## 2024-01-01 PROCEDURE — 83735 ASSAY OF MAGNESIUM: CPT

## 2024-01-01 PROCEDURE — 82803 BLOOD GASES ANY COMBINATION: CPT

## 2024-01-01 PROCEDURE — 2500000003 HC RX 250 WO HCPCS: Performed by: STUDENT IN AN ORGANIZED HEALTH CARE EDUCATION/TRAINING PROGRAM

## 2024-01-01 PROCEDURE — 36415 COLL VENOUS BLD VENIPUNCTURE: CPT

## 2024-01-01 PROCEDURE — 87086 URINE CULTURE/COLONY COUNT: CPT

## 2024-01-01 PROCEDURE — 87040 BLOOD CULTURE FOR BACTERIA: CPT

## 2024-01-01 PROCEDURE — 2580000003 HC RX 258: Performed by: STUDENT IN AN ORGANIZED HEALTH CARE EDUCATION/TRAINING PROGRAM

## 2024-01-01 PROCEDURE — 70450 CT HEAD/BRAIN W/O DYE: CPT

## 2024-01-01 PROCEDURE — 86900 BLOOD TYPING SEROLOGIC ABO: CPT

## 2024-01-01 PROCEDURE — 5A09357 ASSISTANCE WITH RESPIRATORY VENTILATION, LESS THAN 24 CONSECUTIVE HOURS, CONTINUOUS POSITIVE AIRWAY PRESSURE: ICD-10-PCS | Performed by: STUDENT IN AN ORGANIZED HEALTH CARE EDUCATION/TRAINING PROGRAM

## 2024-01-01 PROCEDURE — 96365 THER/PROPH/DIAG IV INF INIT: CPT

## 2024-01-01 PROCEDURE — 84484 ASSAY OF TROPONIN QUANT: CPT

## 2024-01-01 PROCEDURE — 3E033XZ INTRODUCTION OF VASOPRESSOR INTO PERIPHERAL VEIN, PERCUTANEOUS APPROACH: ICD-10-PCS | Performed by: STUDENT IN AN ORGANIZED HEALTH CARE EDUCATION/TRAINING PROGRAM

## 2024-01-01 PROCEDURE — 74174 CTA ABD&PLVS W/CONTRAST: CPT

## 2024-01-01 PROCEDURE — 86850 RBC ANTIBODY SCREEN: CPT

## 2024-01-01 PROCEDURE — 96376 TX/PRO/DX INJ SAME DRUG ADON: CPT

## 2024-01-01 PROCEDURE — 93005 ELECTROCARDIOGRAM TRACING: CPT | Performed by: STUDENT IN AN ORGANIZED HEALTH CARE EDUCATION/TRAINING PROGRAM

## 2024-01-01 PROCEDURE — 2700000000 HC OXYGEN THERAPY PER DAY

## 2024-01-01 PROCEDURE — 2000000000 HC ICU R&B

## 2024-01-01 RX ORDER — VANCOMYCIN 1.75 G/350ML
1250 INJECTION, SOLUTION INTRAVENOUS ONCE
Status: DISCONTINUED | OUTPATIENT
Start: 2024-01-01 | End: 2024-01-01

## 2024-01-01 RX ORDER — LORAZEPAM 0.5 MG/1
0.5 TABLET ORAL
COMMUNITY

## 2024-01-01 RX ORDER — POTASSIUM CHLORIDE 1500 MG/1
40 TABLET, EXTENDED RELEASE ORAL DAILY
COMMUNITY

## 2024-01-01 RX ORDER — IOPAMIDOL 755 MG/ML
75 INJECTION, SOLUTION INTRAVASCULAR
Status: COMPLETED | OUTPATIENT
Start: 2024-01-01 | End: 2024-01-01

## 2024-01-01 RX ORDER — DEXTROMETHORPHAN HYDROBROMIDE AND QUINIDINE SULFATE 20; 10 MG/1; MG/1
1 CAPSULE, GELATIN COATED ORAL 2 TIMES DAILY
COMMUNITY

## 2024-01-01 RX ORDER — SERTRALINE HYDROCHLORIDE 25 MG/1
75 TABLET, FILM COATED ORAL DAILY
COMMUNITY

## 2024-01-01 RX ORDER — LACTULOSE 10 G/15ML
20 SOLUTION ORAL 3 TIMES DAILY
COMMUNITY

## 2024-01-01 RX ORDER — LORAZEPAM 2 MG/ML
2 CONCENTRATE ORAL
Status: DISCONTINUED | OUTPATIENT
Start: 2024-01-01 | End: 2024-01-01 | Stop reason: HOSPADM

## 2024-01-01 RX ORDER — ACETAMINOPHEN 650 MG/1
650 SUPPOSITORY RECTAL EVERY 6 HOURS PRN
Status: DISCONTINUED | OUTPATIENT
Start: 2024-01-01 | End: 2024-01-01 | Stop reason: HOSPADM

## 2024-01-01 RX ORDER — GLYCOPYRROLATE 0.2 MG/ML
0.2 INJECTION INTRAMUSCULAR; INTRAVENOUS EVERY 4 HOURS PRN
Status: DISCONTINUED | OUTPATIENT
Start: 2024-01-01 | End: 2024-01-01 | Stop reason: HOSPADM

## 2024-01-01 RX ORDER — MORPHINE SULFATE 4 MG/ML
4 INJECTION, SOLUTION INTRAMUSCULAR; INTRAVENOUS
Status: DISCONTINUED | OUTPATIENT
Start: 2024-01-01 | End: 2024-01-01 | Stop reason: HOSPADM

## 2024-01-01 RX ORDER — DEXTROSE MONOHYDRATE 100 MG/ML
INJECTION, SOLUTION INTRAVENOUS CONTINUOUS PRN
Status: DISCONTINUED | OUTPATIENT
Start: 2024-01-01 | End: 2024-01-01 | Stop reason: HOSPADM

## 2024-01-01 RX ORDER — SODIUM CHLORIDE 0.9 % (FLUSH) 0.9 %
5-40 SYRINGE (ML) INJECTION PRN
Status: DISCONTINUED | OUTPATIENT
Start: 2024-01-01 | End: 2024-01-01 | Stop reason: HOSPADM

## 2024-01-01 RX ORDER — NOREPINEPHRINE BITARTRATE 0.06 MG/ML
INJECTION, SOLUTION INTRAVENOUS
Status: DISCONTINUED
Start: 2024-01-01 | End: 2024-01-01

## 2024-01-01 RX ORDER — MORPHINE SULFATE 4 MG/ML
4 INJECTION, SOLUTION INTRAMUSCULAR; INTRAVENOUS
Status: DISCONTINUED | OUTPATIENT
Start: 2024-01-01 | End: 2024-01-01

## 2024-01-01 RX ORDER — OXYBUTYNIN CHLORIDE 5 MG/1
5 TABLET ORAL
COMMUNITY

## 2024-01-01 RX ORDER — CALCIUM GLUCONATE 20 MG/ML
1000 INJECTION, SOLUTION INTRAVENOUS ONCE
Status: COMPLETED | OUTPATIENT
Start: 2024-01-01 | End: 2024-01-01

## 2024-01-01 RX ORDER — NOREPINEPHRINE BITARTRATE 0.06 MG/ML
1-100 INJECTION, SOLUTION INTRAVENOUS CONTINUOUS
Status: DISCONTINUED | OUTPATIENT
Start: 2024-01-01 | End: 2024-01-01

## 2024-01-01 RX ORDER — POLYETHYLENE GLYCOL 3350 17 G/17G
17 POWDER, FOR SOLUTION ORAL DAILY PRN
Status: DISCONTINUED | OUTPATIENT
Start: 2024-01-01 | End: 2024-01-01 | Stop reason: HOSPADM

## 2024-01-01 RX ORDER — MORPHINE SULFATE 2 MG/ML
2 INJECTION, SOLUTION INTRAMUSCULAR; INTRAVENOUS
Status: DISCONTINUED | OUTPATIENT
Start: 2024-01-01 | End: 2024-01-01 | Stop reason: HOSPADM

## 2024-01-01 RX ORDER — HEPARIN SODIUM 5000 [USP'U]/ML
5000 INJECTION, SOLUTION INTRAVENOUS; SUBCUTANEOUS EVERY 8 HOURS SCHEDULED
Status: DISCONTINUED | OUTPATIENT
Start: 2024-01-01 | End: 2024-01-01 | Stop reason: HOSPADM

## 2024-01-01 RX ORDER — RISPERIDONE 1 MG/1
1 TABLET ORAL DAILY
COMMUNITY

## 2024-01-01 RX ORDER — SODIUM CHLORIDE 9 MG/ML
INJECTION, SOLUTION INTRAVENOUS PRN
Status: DISCONTINUED | OUTPATIENT
Start: 2024-01-01 | End: 2024-01-01 | Stop reason: HOSPADM

## 2024-01-01 RX ORDER — 0.9 % SODIUM CHLORIDE 0.9 %
1000 INTRAVENOUS SOLUTION INTRAVENOUS ONCE
Status: COMPLETED | OUTPATIENT
Start: 2024-01-01 | End: 2024-01-01

## 2024-01-01 RX ORDER — MORPHINE SULFATE 2 MG/ML
2 INJECTION, SOLUTION INTRAMUSCULAR; INTRAVENOUS
Status: DISCONTINUED | OUTPATIENT
Start: 2024-01-01 | End: 2024-01-01

## 2024-01-01 RX ORDER — NYSTATIN 100000 U/G
CREAM TOPICAL 2 TIMES DAILY
COMMUNITY

## 2024-01-01 RX ORDER — HYDROCORTISONE SODIUM SUCCINATE 100 MG/2ML
100 INJECTION INTRAMUSCULAR; INTRAVENOUS EVERY 8 HOURS
Status: DISCONTINUED | OUTPATIENT
Start: 2024-01-01 | End: 2024-01-01

## 2024-01-01 RX ORDER — ONDANSETRON 4 MG/1
4 TABLET, ORALLY DISINTEGRATING ORAL EVERY 8 HOURS PRN
Status: DISCONTINUED | OUTPATIENT
Start: 2024-01-01 | End: 2024-01-01 | Stop reason: HOSPADM

## 2024-01-01 RX ORDER — GLUCAGON 1 MG/ML
1 KIT INJECTION PRN
Status: DISCONTINUED | OUTPATIENT
Start: 2024-01-01 | End: 2024-01-01 | Stop reason: HOSPADM

## 2024-01-01 RX ORDER — SODIUM CHLORIDE 0.9 % (FLUSH) 0.9 %
5-40 SYRINGE (ML) INJECTION EVERY 12 HOURS SCHEDULED
Status: DISCONTINUED | OUTPATIENT
Start: 2024-01-01 | End: 2024-01-01 | Stop reason: HOSPADM

## 2024-01-01 RX ORDER — NALOXONE HYDROCHLORIDE 1 MG/ML
INJECTION INTRAMUSCULAR; INTRAVENOUS; SUBCUTANEOUS
Status: COMPLETED
Start: 2024-01-01 | End: 2024-01-01

## 2024-01-01 RX ORDER — ACETAMINOPHEN 325 MG/1
650 TABLET ORAL EVERY 6 HOURS PRN
Status: DISCONTINUED | OUTPATIENT
Start: 2024-01-01 | End: 2024-01-01 | Stop reason: HOSPADM

## 2024-01-01 RX ORDER — HYDROCORTISONE SODIUM SUCCINATE 100 MG/2ML
100 INJECTION INTRAMUSCULAR; INTRAVENOUS ONCE
Status: DISCONTINUED | OUTPATIENT
Start: 2024-01-01 | End: 2024-01-01 | Stop reason: SDUPTHER

## 2024-01-01 RX ORDER — ONDANSETRON 2 MG/ML
4 INJECTION INTRAMUSCULAR; INTRAVENOUS EVERY 6 HOURS PRN
Status: DISCONTINUED | OUTPATIENT
Start: 2024-01-01 | End: 2024-01-01 | Stop reason: HOSPADM

## 2024-01-01 RX ADMIN — CALCIUM GLUCONATE 1000 MG: 20 INJECTION, SOLUTION INTRAVENOUS at 09:42

## 2024-01-01 RX ADMIN — Medication 5 MCG/MIN: at 09:25

## 2024-01-01 RX ADMIN — NALOXONE HYDROCHLORIDE 2 MG: 1 INJECTION PARENTERAL at 09:14

## 2024-01-01 RX ADMIN — Medication 2 MG: at 13:37

## 2024-01-01 RX ADMIN — CALCIUM GLUCONATE 1000 MG: 20 INJECTION, SOLUTION INTRAVENOUS at 11:34

## 2024-01-01 RX ADMIN — SODIUM CHLORIDE 1000 ML: 9 INJECTION, SOLUTION INTRAVENOUS at 09:27

## 2024-01-01 RX ADMIN — CEFEPIME 2000 MG: 2 INJECTION, POWDER, FOR SOLUTION INTRAVENOUS at 12:34

## 2024-01-01 RX ADMIN — MORPHINE SULFATE 4 MG: 4 INJECTION, SOLUTION INTRAMUSCULAR; INTRAVENOUS at 13:26

## 2024-01-01 RX ADMIN — IOPAMIDOL 75 ML: 755 INJECTION, SOLUTION INTRAVENOUS at 10:35

## 2024-01-01 RX ADMIN — HYDROCORTISONE SODIUM SUCCINATE 100 MG: 100 INJECTION, POWDER, FOR SOLUTION INTRAMUSCULAR; INTRAVENOUS at 12:28

## 2024-01-01 ASSESSMENT — LIFESTYLE VARIABLES
HOW MANY STANDARD DRINKS CONTAINING ALCOHOL DO YOU HAVE ON A TYPICAL DAY: PATIENT UNABLE TO ANSWER
HOW OFTEN DO YOU HAVE A DRINK CONTAINING ALCOHOL: PATIENT UNABLE TO ANSWER

## 2024-05-09 ENCOUNTER — HOSPITAL ENCOUNTER (EMERGENCY)
Age: 69
Discharge: HOME | End: 2024-05-09
Payer: MEDICARE

## 2024-05-09 ENCOUNTER — APPOINTMENT (OUTPATIENT)
Dept: GENERAL RADIOLOGY | Age: 69
End: 2024-05-09
Payer: MEDICARE

## 2024-05-09 VITALS
TEMPERATURE: 97.6 F | RESPIRATION RATE: 20 BRPM | HEART RATE: 68 BPM | SYSTOLIC BLOOD PRESSURE: 91 MMHG | HEIGHT: 65 IN | WEIGHT: 142.8 LBS | BODY MASS INDEX: 23.79 KG/M2 | OXYGEN SATURATION: 92 % | DIASTOLIC BLOOD PRESSURE: 61 MMHG

## 2024-05-09 DIAGNOSIS — I95.9 HYPOTENSION, UNSPECIFIED HYPOTENSION TYPE: Primary | ICD-10-CM

## 2024-05-09 LAB
ANION GAP SERPL CALCULATED.3IONS-SCNC: 8 MMOL/L (ref 3–16)
BASOPHILS # BLD: 0.1 K/UL (ref 0–0.2)
BASOPHILS NFR BLD: 1.3 %
BUN SERPL-MCNC: 32 MG/DL (ref 7–20)
CALCIUM SERPL-MCNC: 8.9 MG/DL (ref 8.3–10.6)
CHLORIDE SERPL-SCNC: 100 MMOL/L (ref 99–110)
CO2 SERPL-SCNC: 28 MMOL/L (ref 21–32)
CREAT SERPL-MCNC: 1.2 MG/DL (ref 0.6–1.2)
DEPRECATED RDW RBC AUTO: 15 % (ref 12.4–15.4)
EOSINOPHIL # BLD: 0.1 K/UL (ref 0–0.6)
EOSINOPHIL NFR BLD: 2.3 %
GFR SERPLBLD CREATININE-BSD FMLA CKD-EPI: 48 ML/MIN/{1.73_M2}
GLUCOSE SERPL-MCNC: 73 MG/DL (ref 70–99)
HCT VFR BLD AUTO: 34.6 % (ref 36–48)
HGB BLD-MCNC: 11.5 G/DL (ref 12–16)
LYMPHOCYTES # BLD: 2.2 K/UL (ref 1–5.1)
LYMPHOCYTES NFR BLD: 49.5 %
MCH RBC QN AUTO: 33.7 PG (ref 26–34)
MCHC RBC AUTO-ENTMCNC: 33.1 G/DL (ref 31–36)
MCV RBC AUTO: 101.9 FL (ref 80–100)
MONOCYTES # BLD: 0.2 K/UL (ref 0–1.3)
MONOCYTES NFR BLD: 4.8 %
NEUTROPHILS # BLD: 1.9 K/UL (ref 1.7–7.7)
NEUTROPHILS NFR BLD: 42.1 %
PLATELET # BLD AUTO: 171 K/UL (ref 135–450)
PMV BLD AUTO: 9.1 FL (ref 5–10.5)
POTASSIUM SERPL-SCNC: 3.7 MMOL/L (ref 3.5–5.1)
RBC # BLD AUTO: 3.4 M/UL (ref 4–5.2)
SODIUM SERPL-SCNC: 136 MMOL/L (ref 136–145)
WBC # BLD AUTO: 4.5 K/UL (ref 4–11)

## 2024-05-09 PROCEDURE — 85025 COMPLETE CBC W/AUTO DIFF WBC: CPT

## 2024-05-09 PROCEDURE — 2580000003 HC RX 258: Performed by: PHYSICIAN ASSISTANT

## 2024-05-09 PROCEDURE — 96360 HYDRATION IV INFUSION INIT: CPT

## 2024-05-09 PROCEDURE — 99284 EMERGENCY DEPT VISIT MOD MDM: CPT

## 2024-05-09 PROCEDURE — 96361 HYDRATE IV INFUSION ADD-ON: CPT

## 2024-05-09 PROCEDURE — 80048 BASIC METABOLIC PNL TOTAL CA: CPT

## 2024-05-09 RX ORDER — 0.9 % SODIUM CHLORIDE 0.9 %
500 INTRAVENOUS SOLUTION INTRAVENOUS ONCE
Status: COMPLETED | OUTPATIENT
Start: 2024-05-09 | End: 2024-05-09

## 2024-05-09 RX ORDER — 0.9 % SODIUM CHLORIDE 0.9 %
500 INTRAVENOUS SOLUTION INTRAVENOUS ONCE
Status: DISCONTINUED | OUTPATIENT
Start: 2024-05-09 | End: 2024-05-09

## 2024-05-09 RX ADMIN — SODIUM CHLORIDE 250 ML: 9 INJECTION, SOLUTION INTRAVENOUS at 16:42

## 2024-05-09 ASSESSMENT — PAIN SCALES - GENERAL
PAINLEVEL_OUTOF10: 0
PAINLEVEL_OUTOF10: 0

## 2024-05-09 ASSESSMENT — PAIN - FUNCTIONAL ASSESSMENT: PAIN_FUNCTIONAL_ASSESSMENT: 0-10

## 2024-05-09 NOTE — ED NOTES
Pt arrived via EMS from [facility] for c/o pt being hypotensive. EMS reports that pt is hypotensive at baseline, but that [facility] said she's more hypotensive than normal -baseline BP unknown. Pt's BP on arrival 73/48. Pt awake, alert, and oriented x3. Skin warm, dry, and color appropriate for ethnicity. Respirations easy and unlabored. Pt placed in gown and on cardiac monitor. 22g placed in left hand. Blood collected and sent to lab for analysis. Call light within reach. Pt denies further needs at this time.

## 2024-05-09 NOTE — CARE COORDINATION
Discharge Planning:  Unable to complete assessment, Patient doesn't want anyone in her room.  Patient from [facility], has been there since 2/20/2017.  DANICA left message for Shahriar  (admissions) at [facility] regarding patient being admitted and her ability to return.  Patient has legal guardian- [employer] [phone].

## 2024-05-09 NOTE — DISCHARGE INSTRUCTIONS
Continue home medication as prescribed  Follow-up with primary care provider to discuss her blood pressure medication for possible readjustment.  Return emergency room for any worsening

## 2024-05-09 NOTE — ED PROVIDER NOTES
TABS TABLET    Take 1 tablet by mouth 2 times daily    ASCORBIC ACID (VITAMIN C) 500 MG TABLET    Take 1 tablet by mouth daily    CALCIUM CARBONATE 500 (200 CA) MG WAFR    Take 200 mg by mouth in the morning and at bedtime    CARBIDOPA-LEVODOPA (SINEMET)  MG PER TABLET    Take 1 tablet by mouth 3 times daily    DIVALPROEX (DEPAKOTE SPRINKLE) 125 MG DR CAPSULE    Take 4 capsules by mouth every morning    DIVALPROEX (DEPAKOTE SPRINKLE) 125 MG DR CAPSULE    Take 6 capsules by mouth at bedtime    DULOXETINE (CYMBALTA) 30 MG EXTENDED RELEASE CAPSULE    Take 1 capsule by mouth daily    FERROUS SULFATE (IRON 325) 325 (65 FE) MG TABLET    Take 1 tablet by mouth daily (with breakfast)    FUROSEMIDE (LASIX) 20 MG TABLET    Take 1 tablet by mouth daily    GUAIFENESIN 400 MG TABLET    Take 1 tablet by mouth daily    HYDROCORTISONE SODIUM SUCCINATE PF (SOLU-CORTEF) 100 MG SOLR INJECTION    Infuse 2 mLs intravenously daily as needed (adrenal crisis)    MIDODRINE (PROAMATINE) 5 MG TABLET    Take by mouth See Admin Instructions Systolic -120 mmH tablet , 101-110 mmH tablets,  mmHg: 3 tablets    MIRTAZAPINE (REMERON) 7.5 MG TABLET    Take 1 tablet by mouth nightly    POLYETHYLENE GLYCOL (MIRALAX) 17 G PACK PACKET    Take 17 g by mouth daily as needed (constipation)    RISPERIDONE (RISPERDAL) 2 MG TABLET    Take 1 tablet by mouth 2 times daily    SENNOSIDES-DOCUSATE SODIUM (SENOKOT-S) 8.6-50 MG TABLET    Take 1 tablet by mouth at bedtime    VIBEGRON (GEMTESA) 75 MG TABS TABLET    Take 1 tablet by mouth daily       Review of Systems:  (1 systems needed)  Review of Systems   Unable to perform ROS: Dementia       \"Positives and Pertinent negatives as per HPI\"    Physical Exam:  Physical Exam  Vitals and nursing note reviewed.   Constitutional:       Appearance: She is well-developed. She is not diaphoretic.   HENT:      Head: Normocephalic and atraumatic.      Nose: Nose normal.      Mouth/Throat:      Mouth:

## 2024-05-09 NOTE — ED NOTES
Provided pt with sandwich, meri crackers and juice as Ok'd by ERICK Duenas. Pt resting comfortably with no distress noted. Pt denies further needs at this time. Call light within reach.

## 2024-12-26 PROBLEM — N17.9 AKI (ACUTE KIDNEY INJURY) (HCC): Status: ACTIVE | Noted: 2024-01-01

## 2024-12-26 NOTE — CONSULTS
Clinical Pharmacy Note  Vancomycin Consult    Pharmacy consult received for one-time dose of vancomycin in the Emergency Department.    Ht Readings from Last 1 Encounters:   05/09/24 1.651 m (5' 5\")        Wt Readings from Last 1 Encounters:   12/26/24 59.4 kg (130 lb 15.3 oz)         Assessment/Plan:  Vancomycin 1250 mg x 1 in ED.  If vancomycin is to continue on admission and pharmacy is to manage dosing, please re-consult with admission orders.

## 2024-12-26 NOTE — CONSULTS
General Surgery    Consult received.  Patient information, chart and imaging reviewed.  Case discussed via phone with ED provider.      Reportedly, patient is a 69F who presented with a multitude of issues including shock (likely septic).  Central venous access was obtained by ED MD via left femoral vein with US guidance.  The insertion was uncomplicated.     The line was able to be flushed but did not aspirate.  CTA was obtained, which was personally reviewed and demonstrated evidence of an extraluminal CVC in the LLQ with associated small fluid and air collection.      The line was removed.      On review of the images, there seems to be no injury to the adjacent sigmoid colon or visceral organs.  The fluid is likely injected irrigant, but hematoma cannot be ruled out.     No indication for general surgery intervention.  According to EHR, the patient's family is pursing comfort measures.  If the goals of care were to change and/or if hemodynamic instability or hemorrhagic collapse ensues, would have low threshold for repeat CTA with venous delay, as well as consult to vascular surgery.     General surgery will not follow unless requested.     Electronically signed by Inder Garrison MD on 12/26/2024 at 1:47 PM

## 2024-12-26 NOTE — CONSULTS
Pulmonary/Critical Care Progress Note    After discussion with Palliative Care NP Oxana, comfort measures being pursued for patient at this time. Will defer consult.    ALVARO Arreola Pulmonary, Sleep, and Critical Care

## 2024-12-26 NOTE — CONSULTS
PALLIATIVE MEDICINE CONSULTATION     Patient name:Bernadette Griffin   MRN:[id number]    :1955  Room/Bed:X6F-76428-01   LOS: 0 days         Date of consult:2024    Inpatient consult to Palliative Care  Consult performed by: Oxana Caraballo APRN - CNP  Consult ordered by: Linnea Desai MD  Reason for consult: Goals of care        ASSESSMENT/RECOMMENDATIONS     69 y.o. female with altered mental status secondary to multisystem failure.      Symptom Management:  Acute respiratory failure with hypoxia -on BiPAP support.  Confirmed with guardian the patient would not wish to pursue intubation.  Given goals of care, will place oxygen per nasal cannula for comfort.  Comfort medications placed for dyspnea.  Hyperkalemia -nephrology consulted, will likely need CRRT.  Plans to pursue comfort measures.  SALINA on CKD -nephrology consulted, guardian would not wish to pursue dialysis.  Acute on chronic congestive heart failure -plans for comfort measures.  Sepsis -currently on Levophed for hypotension, IV antibiotics.  Will stop aggressive measures and pursue comfort care per guardian's wishes.  Goals of Care - See below.     Patient/Family Goals of Care :    Following introductions, a broad overview of her clinical course was given, followed by the opportunity for those present to ask any questions regarding the medical aspects of the case. I explained the typical trajectory that occurs when age and frailty meet chronic medical illness. Emphasis was placed on the subjective nature of quality of life, and what a person is willing to endure to stay alive, even if his/her medical status continues to decline.   Guardian confirmed they would not want to continue life-prolonging treatment at this point, which opened the discussion to the process of removal of life support to allow natural death (not \"withdraw care\").   Specifics of an end-of-life/comfort-focused treatment plan were discussed with

## 2024-12-26 NOTE — ED TRIAGE NOTES
Patient arrived to the ED via EMS from [facility]. Patient arrived being bagged per EMS. States call was for unresponsiveness. Patient's LKW was last night 12/25/24 but time unknown. Per EMS, initial O2 sats 65% RA, BP 65/20 and blood glucose 119. Patient responsive to painful stimuli only.

## 2024-12-26 NOTE — CARE COORDINATION
Call placed to Shahriar with [facility] inquiring if patient had already paid for her  arrangements. Per Shahriar, he believes she may have prepaid and he is on his way to [facility] to visit another patient and will check his computer and call back.     Vincenzo GLORIA RN  Case Management  [phone]    Electronically signed by Vincenzo Espino RN on 2024 at 3:02 PM    Addendum:    Received a return call from Shahriar at [facility] stating the  was out of the office today and he does know she has prepaid for her  arrangements. Shahriar provided the name and number to call. Voicemail message left for:    Jon Mayorga [phone] with [facility] requesting a return call.     Vincenzo GLORIA RN  Case Management  [phone]    Electronically signed by Vincenzo Espino RN on 2024 at 4:03 PM    Addendum:    Received call back from Jon Mayorga with [facility].  was prepaid for and they will either  her body today or tomorrow morning.     Vincenzo GLORIA RN  Case Management  [phone]    Electronically signed by Vincenzo Espino RN on 2024 at 4:16 PM       no

## 2024-12-26 NOTE — PROGRESS NOTES
Clinical Pharmacy Note  Renal Dose Adjustment    Bernadette Griffin is receiving cefepime.  This medication is renally eliminated.  Based on the patient's Estimated Creatinine Clearance: 13 mL/min (A) (based on SCr of 3.7 mg/dL (H)). and dx CAP, the dose has been adjusted to 1000 mg Q12H per protocol.    Pharmacy will continue to monitor and adjust dose as needed for changes in renal function.      Nguyen Soni RPH, PharmD 12/26/2024 12:58 PM

## 2024-12-26 NOTE — ED PROVIDER NOTES
EMERGENCY DEPARTMENT ENCOUNTER      CHIEF COMPLAINT    Respiratory Distress (From [facility])    HPI    Bernadette Griffin is a 69 y.o. female with past medical history significant for SALINA, Renal Failure, Parkinson, Schizophrenia, Sepsis who presents hypotensive and short of breath    History is very much limited but per EMS report patient's last known well was yesterday before bedtime, today she was found unresponsive in respiratory distress and route for EMS had blood pressure 50/20 oxygen saturations in the 50 he was being bagged and route patient is apparently a full code  No other collateral information was able to be obtained from nursing home  We are able to speak with her legal guardian who states that patient should not be a full code that she is a DNR CCA DNI, was otherwise okay with medical interventions    PAST MEDICAL HISTORY    Past Medical History:   Diagnosis Date    Acute kidney failure (HCC)     Acute respiratory failure (HCC)     Anemia     Anhidrosis     CHF (congestive heart failure) (HCC)     Delusional disorder (HCC)     Dementia (HCC)     Hypertension     Muscle weakness (generalized)     Parkinson disease (HCC)     Pneumonia     Schizophrenia (HCC)     Sepsis (HCC)     Vitamin D deficiency        SURGICAL HISTORY    No past surgical history on file.    CURRENT MEDICATIONS          ALLERGIES    No Known Allergies    Family history reviewed and noncontributory other than:  No family history on file.    Social history reviewed and noncontributory other than:  Social History     Socioeconomic History    Marital status: Unknown     Spouse name: Not on file    Number of children: Not on file    Years of education: Not on file    Highest education level: Not on file   Occupational History    Not on file   Tobacco Use    Smoking status: Never    Smokeless tobacco: Never   Substance and Sexual Activity    Alcohol use: Not Currently    Drug use: Never    Sexual activity: Not on file   Other Topics

## 2024-12-26 NOTE — PROGRESS NOTES
Pharmacy Medication Reconciliation Note     List of medications patient is currently taking is complete.    Source of information:   [facility] med list    Notes regarding home medications:   Patient was found unresponsive this morning with LKW last night so no medications were given per nursing staff     Lacho Hensley PharmD  12/26/2024  10:40 AM

## 2024-12-26 NOTE — CONSULTS
[facility]  Phone: [phone]  Fax: [phone]  [place]         Reason for Consult: Hyperkalemia Hypercal anemia acute kidney injury  Requesting Physician:  Dr. Saldaña    History Obtained From:  ER ,  electronic medical record    History of Present Ilness: 69-year-old white female nursing home resident with history of bipolar disorder, hypertension, CHF, chronic kidney disease with a baseline creatinine of around 1.2 mg  Presented via EMS from [facility] being bagged she was unresponsive  Noted to have SALINA with a creatinine of 3.7 BUN of 71, hyperkalemic with a potassium of 6.9 and severely hypercalcemic with a calcium of 16.1  Patient was hypotensive with a pressure in the 50s  She was given saline bolus and started on Levophed  Patient is a DNI and placed on BiPAP  Unable to obtain any information from the patient  She received medical therapy for hyperkalemia  No urine output was noted after Francis placement    Past Medical History:        Diagnosis Date    Acute kidney failure (HCC)     Acute respiratory failure (HCC)     Anemia     Anhidrosis     CHF (congestive heart failure) (HCC)     Delusional disorder (HCC)     Dementia (HCC)     Hypertension     Muscle weakness (generalized)     Parkinson disease (HCC)     Pneumonia     Schizophrenia (HCC)     Sepsis (HCC)     Vitamin D deficiency        Past Surgical History:    No past surgical history on file.    Home Medications:    No current facility-administered medications on file prior to encounter.     Current Outpatient Medications on File Prior to Encounter   Medication Sig Dispense Refill    risperiDONE (RISPERDAL) 1 MG tablet Take 1 tablet by mouth daily Total 3 mg in morning      sertraline (ZOLOFT) 25 MG tablet Take 3 tablets by mouth daily      potassium chloride (KLOR-CON M) 20 MEQ extended release tablet Take 2 tablets by mouth daily      oxyBUTYnin (DITROPAN) 5 MG tablet Take 1 tablet by mouth nightly       dextromethorphan-quiNIDine (NUEDEXTA) 20-10 MG CAPS per capsule Take 1 capsule by mouth in the morning and at bedtime      nystatin (MYCOSTATIN) 442546 UNIT/GM cream Apply topically 2 times daily Apply topically 2 times daily to back and as needed for skin integrity      LORazepam (ATIVAN) 0.5 MG tablet Take 1 tablet by mouth nightly.      lactulose (CHRONULAC) 10 GM/15ML solution Take 30 mLs by mouth 3 times daily      amiodarone (CORDARONE) 200 MG tablet Take 1 tablet by mouth daily      apixaban (ELIQUIS) 5 MG TABS tablet Take 1 tablet by mouth 2 times daily      ascorbic acid (VITAMIN C) 500 MG tablet Take 1 tablet by mouth daily      divalproex (DEPAKOTE SPRINKLE) 125 MG DR capsule Take 4 capsules by mouth every morning      divalproex (DEPAKOTE SPRINKLE) 125 MG DR capsule Take 6 capsules by mouth at bedtime      ferrous sulfate (IRON 325) 325 (65 Fe) MG tablet Take 1 tablet by mouth daily (with breakfast)      midodrine (PROAMATINE) 5 MG tablet Take by mouth See Admin Instructions Systolic -120 mmH tablet , 101-110 mmH tablets,  mmHg: 3 tablets      mirtazapine (REMERON) 7.5 MG tablet Take 1 tablet by mouth nightly      polyethylene glycol (MIRALAX) 17 g PACK packet Take 17 g by mouth daily      risperiDONE (RISPERDAL) 2 MG tablet Take 1 tablet by mouth 2 times daily      sennosides-docusate sodium (SENOKOT-S) 8.6-50 MG tablet Take 1 tablet by mouth at bedtime      carbidopa-levodopa (SINEMET)  MG per tablet Take 1 tablet by mouth 3 times daily         Allergies:  Patient has no known allergies.    Social History:    Social History     Socioeconomic History    Marital status: Unknown     Spouse name: Not on file    Number of children: Not on file    Years of education: Not on file    Highest education level: Not on file   Occupational History    Not on file   Tobacco Use    Smoking status: Never    Smokeless tobacco: Never   Substance and Sexual Activity    Alcohol use: Not Currently

## 2024-12-26 NOTE — PROGRESS NOTES
Patient arrived to unit on bipap, not interactive but groans when turned. Patient placed on monitor, bradycardic BP low on 60 of levophed. RN increased rate, decision to withdraw care was reached. RN will give PRN medicaitons for comfort and take off bipap and stop levophed.

## 2024-12-26 NOTE — ED TRIAGE NOTES
Spoke to the nurse Earnest from [facility].  She did have another other contact info for this patients guardian.  Irina Fisher, [phone].  She called and told the MD and this RN that patient will be changed to a DNR-CC, no intubation, no CPR, medications are ok.

## 2024-12-26 NOTE — H&P
Contacted from ED via [facility] for admission for septic shock    Patient is a 69 year old female who came nursing home for unresponsiveness. Last well known time 12/25/24. Hypoxic and hypotensive on arrival and patient only responsive to painful stimuli    Patient was being managed for :    SALINA and hyperkalemia : Nephrology was consulted with plans to start RRT.    Acute respiratory failure with hypoxia : On BiPaP in ER. According to ER notes the patient was DNR CC with no plans for intubation     Septic shock : CT showing pneumonia and was started on IV antibiotics    Palliative care was consulted and after discussion with patient's guardian and decided to withdrew care and did not wish for dialysis or intubation or any further measures

## 2024-12-26 NOTE — ED NOTES
ED TO INPATIENT SBAR HANDOFF    Patient Name: Bernadette Griffin   Preferred Name: Bernadette  : 1955  69 y.o.   Family/Caregiver Present: no   Code Status Order: DNR-CC  PO Status: NPO:Yes  Telemetry Order:   C-SSRS:    Sitter no     Restraints:     Sepsis Risk Score      Situation  Chief Complaint   Patient presents with    Respiratory Distress     From [facility]     Brief Description of Patient's Condition: responsive to painful stimuli  Mental Status:   Arrived from:Skilled Care Facility  Imaging:   CT HEAD WO CONTRAST   Final Result   No acute intracranial abnormality.      Pansinusitis.         CTA CHEST ABDOMEN PELVIS W CONTRAST   Final Result   1. No acute vascular abnormality of the chest, abdomen or pelvis.   2. Multifocal pneumonia with dense consolidation in the right lower lobe.   3. Mediastinal and right hilar lymph node enlargement, likely reactive.   4. Moderate hiatal hernia with mucosal thickening and fluid in the mid and   distal esophagus.   5. Ill-defined fluid collection in the left inguinal region adjacent to the   sigmoid colon with a catheter in place. This may represent a chronic hematoma   or seroma.  Correlate with prior clinical and procedure history.   6. Underlying pattern of centrilobular emphysema.         CT CERVICAL SPINE WO CONTRAST   Final Result   No acute traumatic abnormality of the cervical spine.         CT THORACIC SPINE BONY RECONSTRUCTION   Final Result   No acute traumatic abnormality identified in the thoracic or lumbar spine.         CT LUMBAR SPINE BONY RECONSTRUCTION   Final Result   No acute traumatic abnormality identified in the thoracic or lumbar spine.         XR CHEST 1 VIEW   Final Result   COPD with asymmetric ground-glass opacities in the right lung.  Asymmetric   pattern of edema or developing pneumonitis may be considered.           Abnormal labs:   Abnormal Labs Reviewed   BASIC METABOLIC PANEL - Abnormal; Notable for the following components:        Result Value    Potassium 6.9 (*)     Chloride 112 (*)     CO2 19 (*)     BUN 71 (*)     Creatinine 3.7 (*)     Est, Glom Filt Rate 12 (*)     Calcium 16.1 (*)     All other components within normal limits    Narrative:     CALL  [facility]  [employer] tel. [phone],  Chemistry results called to and read back by Chanel Charge Nurse,  12/26/2024 10:58, by ARGENIS   MAGNESIUM - Abnormal; Notable for the following components:    Magnesium 3.20 (*)     All other components within normal limits    Narrative:     CALL  [facility]  [employer] tel. [phone],  Chemistry results called to and read back by Chanel Charge Nurse,  12/26/2024 10:58, by ARGENIS   CBC WITH AUTO DIFFERENTIAL - Abnormal; Notable for the following components:    WBC 2.1 (*)     RBC 2.47 (*)     Hemoglobin 8.3 (*)     Hematocrit 27.3 (*)     .7 (*)     MCHC 30.5 (*)     RDW 19.3 (*)     Neutrophils Absolute 1.3 (*)     Lymphocytes Absolute 0.6 (*)     Bands Relative 18 (*)     nRBC 1 (*)     Anisocytosis 1+ (*)     All other components within normal limits   HEPATIC FUNCTION PANEL - Abnormal; Notable for the following components:    Total Protein 5.3 (*)     Albumin 2.1 (*)     ALT <5 (*)     All other components within normal limits    Narrative:     CALL  [facility]  [employer] tel. [phone],  Chemistry results called to and read back by Chanel Charge Nurse,  12/26/2024 10:58, by ARGENIS   LACTIC ACID - Abnormal; Notable for the following components:    Lactic Acid 4.0 (*)     All other components within normal limits    Narrative:     CALL  [facility]  [employer] tel. [phone],  Chemistry results called to and read back by Chanel Charge Nurse,  12/26/2024 10:58, by ARGENIS   TROPONIN - Abnormal; Notable for the following components:    Troponin, High Sensitivity 84 (*)     All other components within normal limits    Narrative:     CALL  [facility]  [employer] tel. [phone],  Chemistry results called to and read back by Chanel Charge Nurse,  12/26/2024 10:58, by ARGENIS   TROPONIN

## 2024-12-26 NOTE — DEATH NOTES
Death Pronouncement Note  Patient's Name: Bernadette Griffin   Patient's YOB: 1955  MRN Number: [id number]    Admitting Provider: Vishnu Saldaña MD  Attending Provider: Vishnu Saldaña MD    Patient was examined and the following were absent: Pulses, Blood Pressure, and Respiratory effort    I declared the patient dead on 12/26/2024 at 2:11 PM    Preliminary Cause of Death: Septic shock (HCC)     Electronically signed by Vishnu Saldaña MD on 12/26/24 at 2:20 PM EST

## 2024-12-27 LAB
BACTERIA UR CULT: ABNORMAL
ORGANISM: ABNORMAL
REPORT: NORMAL

## 2024-12-28 LAB
BACTERIA BLD CULT ORG #2: ABNORMAL
ORGANISM: ABNORMAL
ORGANISM: ABNORMAL

## 2024-12-29 LAB
ANISOCYTOSIS BLD QL SMEAR: ABNORMAL
BACTERIA BLD CULT: ABNORMAL
BASOPHILS # BLD: 0 K/UL (ref 0–0.2)
BASOPHILS NFR BLD: 1 %
DEPRECATED RDW RBC AUTO: 19.3 % (ref 12.4–15.4)
EOSINOPHIL # BLD: 0 K/UL (ref 0–0.6)
EOSINOPHIL NFR BLD: 0 %
HCT VFR BLD AUTO: 27.3 % (ref 36–48)
HGB BLD-MCNC: 8.3 G/DL (ref 12–16)
LYMPHOCYTES # BLD: 0.6 K/UL (ref 1–5.1)
LYMPHOCYTES NFR BLD: 26 %
MCH RBC QN AUTO: 33.7 PG (ref 26–34)
MCHC RBC AUTO-ENTMCNC: 30.5 G/DL (ref 31–36)
MCV RBC AUTO: 110.7 FL (ref 80–100)
MONOCYTES # BLD: 0.2 K/UL (ref 0–1.3)
MONOCYTES NFR BLD: 9 %
NEUTROPHILS # BLD: 1.3 K/UL (ref 1.7–7.7)
NEUTROPHILS NFR BLD: 43 %
NEUTS BAND NFR BLD MANUAL: 18 % (ref 0–7)
NRBC BLD-RTO: 1 /100 WBC
ORGANISM: ABNORMAL
ORGANISM: ABNORMAL
PATH INTERP BLD-IMP: NO
PLATELET # BLD AUTO: 157 K/UL (ref 135–450)
PLATELET BLD QL SMEAR: ADEQUATE
PMV BLD AUTO: 9.7 FL (ref 5–10.5)
RBC # BLD AUTO: 2.47 M/UL (ref 4–5.2)
SLIDE REVIEW: ABNORMAL
VARIANT LYMPHS NFR BLD MANUAL: 3 % (ref 0–6)
WBC # BLD AUTO: 2.1 K/UL (ref 4–11)

## 2024-12-30 NOTE — DISCHARGE SUMMARY
69 y.o. female with altered mental status secondary to multisystem failure.          Acute respiratory failure with hypoxia -on BiPAP support in ER.  Confirmed with guardian the patient would not wish to pursue intubation.  Hyperkalemia -nephrology consulted who recommended CRT.  Plans to pursue comfort measures.  SALINA on CKD -nephrology consulted, guardian would not wish to pursue dialysis.  Sepsis -started on Levophed for hypotension, IV antibiotics.      Patient's guardian confirmed they did not want to continue life prolonging treatment measures and wanted to pursue end of life care and decision was made to withdraw care    Patient declared dead on 12/26/2024 at 2:11 PM

## 2024-12-31 LAB
BACTERIA BLD CULT ORG #2: ABNORMAL
BACTERIA BLD CULT ORG #2: ABNORMAL
BACTERIA BLD CULT: ABNORMAL
ORGANISM: ABNORMAL
